# Patient Record
Sex: MALE | Race: WHITE
[De-identification: names, ages, dates, MRNs, and addresses within clinical notes are randomized per-mention and may not be internally consistent; named-entity substitution may affect disease eponyms.]

---

## 2018-07-09 ENCOUNTER — HOSPITAL ENCOUNTER (EMERGENCY)
Dept: HOSPITAL 97 - ER | Age: 73
Discharge: HOME | End: 2018-07-09
Payer: COMMERCIAL

## 2018-07-09 DIAGNOSIS — I10: ICD-10-CM

## 2018-07-09 DIAGNOSIS — J18.8: Primary | ICD-10-CM

## 2018-07-09 LAB
BUN BLD-MCNC: 15 MG/DL (ref 7–18)
GLUCOSE SERPLBLD-MCNC: 91 MG/DL (ref 74–106)
HCT VFR BLD CALC: 45.3 % (ref 39.6–49)
INR BLD: 1.26
LYMPHOCYTES # SPEC AUTO: 1.4 K/UL (ref 0.7–4.9)
MCH RBC QN AUTO: 32.9 PG (ref 27–35)
MCV RBC: 96.7 FL (ref 80–100)
PMV BLD: 9.2 FL (ref 7.6–11.3)
POTASSIUM SERPL-SCNC: 4.2 MMOL/L (ref 3.5–5.1)
RBC # BLD: 4.68 M/UL (ref 4.33–5.43)

## 2018-07-09 PROCEDURE — 85379 FIBRIN DEGRADATION QUANT: CPT

## 2018-07-09 PROCEDURE — 99285 EMERGENCY DEPT VISIT HI MDM: CPT

## 2018-07-09 PROCEDURE — 85610 PROTHROMBIN TIME: CPT

## 2018-07-09 PROCEDURE — 80048 BASIC METABOLIC PNL TOTAL CA: CPT

## 2018-07-09 PROCEDURE — 71275 CT ANGIOGRAPHY CHEST: CPT

## 2018-07-09 PROCEDURE — 36415 COLL VENOUS BLD VENIPUNCTURE: CPT

## 2018-07-09 PROCEDURE — 93005 ELECTROCARDIOGRAM TRACING: CPT

## 2018-07-09 PROCEDURE — 71046 X-RAY EXAM CHEST 2 VIEWS: CPT

## 2018-07-09 PROCEDURE — 81003 URINALYSIS AUTO W/O SCOPE: CPT

## 2018-07-09 PROCEDURE — 85025 COMPLETE CBC W/AUTO DIFF WBC: CPT

## 2018-07-09 PROCEDURE — 84484 ASSAY OF TROPONIN QUANT: CPT

## 2018-07-09 NOTE — XMS REPORT
Summary of Care: 18 - 18

 Created on:2022



Patient:LEISA SEPULVEDA

Sex:Male

:1945

External Reference #:[id number]





Demographics







 [place] [address]

 

 Home Phone  [phone]

 

 Email Address  [email or web address]

 

 Preferred Language  English

 

 Marital Status  Unknown

 

 Jew Affiliation  Unknown

 

 Race  White

 

 Additional Race(s)  Unavailable

 

 Ethnic Group  Not  or 









Author







 Organization  [facility]

 

 Address  [address]



   [place] [address]-

 

 Phone  [phone]









Encounter

HQ Hannantr_jaylyn(FIN) [id number] Date(s): 18 - 18

[facility] [address] [place], [place] [address] 

Attending Physician: Kimberley Sandoval MD





Vital Signs

No data available for this section



Problem List







 Condition  Effective Dates  Status  Health Status  Informant

 

 Arthritis(Confirmed)    Resolved    

 

 Arthritis of knee1    Active    

 

 Asthma2    Active    

 

 Atrial fibrillation3    Active    

 

 Back strain(Confirmed)    Resolved    

 

 Benign essential hypertension4    Active    

 

 Benign prostatic hypertrophy with    Active    



 outflow obstruction5        

 

 Broken forearm(Confirmed)6    Resolved    

 

 Bronchitis(Confirmed)    Resolved    

 

 Chronic obstructive lung disease7    Active    

 

 External hemorrhoid(Confirmed)    Resolved    

 

 Fatigue(Confirmed)    Resolved    

 

 Osteopenia8    Active    

 

 Peripheral autonomic neuropathy of    Resolved    



 unknown cause(Confirmed)        

 

 Peripheral nerve disease9    Active    

 

 Viral pneumonia(Confirmed)    Resolved    



1Data migrated from [employer] on 5/30/15.2Data migrated from [employer] 
on 5/30/15.3Data migrated from [employer] on 5/30/15.4Data migrated from [employer] on 5/30/15.5Data migrated from [employer] on 5/30/[id number] 
migrated from [employer] on 5/30/15.8Data migrated from [employer] on 5/30
/15.9Data migrated from [employer] on 5/30/15.



Allergies, Adverse Reactions, Alerts







 Substance  Reaction  Severity  Status

 

 NKDA1      Active



1Data migrated from [employer] on 16. Originally documented as NKA.



Medications

No data available for this section



Results

No data available for this section



Immunizations

No data available for this section



Procedures







 Procedure  Date  Related Diagnosis  Body Site  Status

 

 Procedure on eye        Completed

 

 Procedure on wrist        Completed

 

 Sebaceous cyst removal1        Completed



1Cyst removed from back



Social History







 Social History Type  Response

 

 Substance Abuse  Use: None.

 

 Employment/School  Status: Retired.1

 

 Alcohol  Current, Type Wine.  Frequency: 1-2 times per week.

 

 Smoking Status  Former smoker; Exposure to Tobacco Smoke None; Cigarette 
Smoking Last 365 Days No; Reg Smoking Cessation Counseling No



   entered on: 3/15/18



1Surrogate decision maker- Wife- Carole Lacho [phone]



Assessment and Plan

No data available for this section

## 2018-07-09 NOTE — XMS REPORT
Continuity of Care Document

 Created on:2013



Patient:LEISA SEPULVEDA

Sex:Male

:1945

External Reference #:[id number]





Demographics







 Address  [address] [place] [address]

 

 Phone  [phone]

 

 Preferred Language  Unknown

 

 Marital Status  Other

 

 Mosque Affiliation  Unknown

 

 Race  Unknown

 

 Ethnic Group  Unknown









Author







 Organization  [facility]









Care Team Providers







 Name  Role  Phone

 

 MD Crys, Enmanuel  Unavailable  Unavailable









Insurance Providers







 Payer name  Policy type /  Policy ID  Covered party ID  Policy Tran



   Coverage type      

 

 [place]: [place]        



 SOLUTIONS        

 

 [employer] - [employer] -        



 CHOICE (POS II)        







Encounters







 Encounter  Performer  Location  Date

 

 Lab Report  Enmanuel Spangler MD  [facility]  2013







Problems







 Problem  Effective Dates  Problem Status

 

 ATRIAL FIBRILLATION    Active

 

 ESSENTIAL HYPERTENSION, BENIGN    Active

 

 OSTEOPENIA    Active

 

 COPD    Active

 

 ASTHMA, UNSPECIFIED, UNSPECIFIED STATUS    Active

 

 BENIGN PROSTATIC HYPERTROPHY, WITH URINARY OBSTRUCTION    Active

 

 ARTHRITIS, KNEES, BILATERAL    Active

 

 PERIPHERAL NEUROPATHY    Active

 

 PREVENTIVE HEALTH CARE  2013  Active







Procedures







 Date  Description  Comments

 

 Sep 10, 2010  bone density  Done

 

 Sep 10, 2010  bone density  Done

 

 2013  smoking status  current every day smoker

 

 Sep 20, 2010  bone density  Complete std dev







Medications







 Medication  Instructions  Start Date  Status

 

 NAPROXEN SODIUM 550 MG TABS  PRN    Inactive

 

 LOTREL 5-20 MG CAPS  1 tablet twice day  2013  Active

 

 NAPROXEN 500 MG TABS  1 tablet daily  2013  Active

 

 MULTAQ 400 MG TABS  1 tablet twice day  2013  Active

 

 PRADAXA 150 MG CAPS  1 tablet daily  2013  Active

 

 CENTRUM SILVER ULTRA MENS TABS    2013  Active

 

 SUPER B-COMPLEX CAPS    2013  Active

 

 DEXILANT 30 MG CPDR    2013  Active







Vital Signs







 Date  Description  Test  Result

 

 2013  weight E&M - 3141-9  WEIGHT  200 lb

 

 2013  temperature E&M  TEMPERATURE  97.0 deg f

 

 2013  pulse rate E&M - 8867-4  PULSE RATE  68 /min

 

 2013  blood pressure, systolic - 8480-6  BP SYSTOLIC  150 mm Hg

 

 2013  blood pressure, diastolic - 8462-4  BP DIASTOLIC  80 mm Hg

 

 2013  height E&M - 8302-2  HEIGHT  73 in







Results







 Date  Description  Test Name  Value  Reference  Interpretation  Status

 

 ,  hemoglobin, blood  HGB  15.4 g/dL  14.0-18.0    



 2013,  hematocrit, blood  HCT  45.7 %  42.0-54.0    



 2013,  platelet count  PLATELETS  177 K/CMM  133-450    



       /mm3      

 

 ,  hemoglobin, blood  HGB  15.4 g/dL  14.0-18.0    



 2013,  hematocrit, blood  HCT  45.7 %  42.0-54.0    



 2013,  platelet count  PLATELETS  177 K/CMM  133-450    



       /mm3      

 

 ,  cholesterol, serum  CHOLESTEROL  190 mg/dl  <=199    



 2013,  triglyceride, serum,  TRIGLYCERIDE  45 mg/dl  <=149    



   fasting          

 

 ,  HDL cholesterol,  HDL  69 mg/dl  >=61    



 2013  serum          

 

 Nov 05,  sodium, serum  SODIUM  142 MEQ/L  135-145    



       mmol/L      

 

 ,  potassium, serum  POTASSIUM  4.2 MEQ/L  3.5-5.1    



       mmol/L      

 

 ,  creatinine, serum  CREATININE  1.0 mg/dL  0.5-1.4    



 2013,  urea nitrogen, blood  BUN  18 mg/dL  7-22    



 2013,  urea  BUN/CREAT  18 null  6-25    



   nitrogen/creatinine          



   ratio, serum          

 

 ,  albumin, serum  ALBUMIN  4.3 g/dL  3.5-5.0    



 2013,  calcium, serum  CALCIUM  9.3 mg/dL  8.5-10.5    



 2013,  alanine  SGPT (ALT)  27 U/L  0-65    



   aminotransferase          



   (SGPT), serum          

 

 ,  aspartate  SGOT (AST)  19 U/L  0-37    



   aminotransferase          



   (SGOT), serum          

 

 ,  alkaline  ALK PHOS  80 U/L      



   phosphatase, serum          

 

 ,  thyroid stimulating  TSH  1.570  0.360-3.740    



   hormone, serum    uIU/mL      

 

 ,  prostate specific  PSA  2.54  0.00-4.00    



   antigen    ng/mL      

 

 Sep 25,  prostate specific  PSA  3.3 ng/mL      



   antigen          

 

 Sep 25,  prostate specific  PSA  3.3 ng/mL      



   antigen          

 

 ,  cholesterol, serum  CHOLESTEROL  190 mg/dl  <=199    



 2013,  triglyceride, serum,  TRIGLYCERIDE  45 mg/dl  <=149    



   fasting          

 

 ,  HDL cholesterol,  HDL  69 mg/dl  >=61    



   serum          

 

 ,  LDL cholesterol,  LDL  112 mg/dl  <=99  High  



 2013  serum          

 

 Nov 05,  sodium, serum  SODIUM  142 MEQ/L  135-145    



       mmol/L      

 

 ,  potassium, serum  POTASSIUM  4.2 MEQ/L  3.5-5.1    



       mmol/L      

 

 ,  creatinine, serum  CREATININE  1.0 mg/dL  0.5-1.4    



 2013,  urea nitrogen, blood  BUN  18 mg/dL  7-22    



 2013,  urea  BUN/CREAT  18 null  6-25    



   nitrogen/creatinine          



   ratio, serum          

 

 ,  albumin, serum  ALBUMIN  4.3 g/dL  3.5-5.0    



 2013,  calcium, serum  CALCIUM  9.3 mg/dL  8.5-10.5    



 2013,  alanine  SGPT (ALT)  27 U/L  0-65    



   aminotransferase          



   (SGPT), serum          

 

 ,  aspartate  SGOT (AST)  19 U/L  0-37    



   aminotransferase          



   (SGOT), serum          

 

 ,  alkaline  ALK PHOS  80 U/L      



   phosphatase, serum          

 

 ,  thyroid stimulating  TSH  1.570  0.360-3.740    



   hormone, serum    uIU/mL      

 

 ,  prostate specific  PSA  2.54  0.00-4.00    



   antigen    ng/mL

## 2018-07-09 NOTE — XMS REPORT
Continuity of Care Document

 Created on:2018



Patient:LEISA SEPULVEDA

Sex:Male

:1945

External Reference #:[id number]





Demographics







 Address  [address] [place] [address]

 

 Phone  [phone]

 

 Phone  [phone]

 

 Preferred Language  Unknown

 

 Marital Status  Unknown

 

 [employer] Affiliation  Unknown

 

 Race  Unknown

 

 Ethnic Group  Unknown









Author







 Organization  [facility]









Problems







 Problem  Status  Onset  Classification  Date  Comments  Source



     Date    Reported    



             



             



             

 

 PREVENTIVE  Active  20  Condition  2015     Medical



 HEALTH CARE    13        Group



             



             

 

 ATRIAL  Active    Condition  2015     Medical



 FIBRILLATION            Group



             



             

 

 ESSENTIAL  Active    Condition  2015     Medical



 HYPERTENSION,            Group



 BENIGN            



             

 

 OSTEOPENIA  Active    Condition  2015     Medical



             Group



             



             

 

 COPD  Active    Condition  2015     Medical



             Group



             



             

 

 ASTHMA,  Active    Condition  2015     Medical



 UNSPECIFIED,            Group



 UNSPECIFIED            



 STATUS            



             

 

 BENIGN  Active    Condition  2015     Medical



 PROSTATIC            Group



 HYPERTROPHY,            



 WITH URINARY            



 OBSTRUCTION            



             



             

 

 ARTHRITIS,  Active    Condition  2015     Medical



 KNEES,            Group



 BILATERAL            



             



             

 

 PERIPHERAL  Active    Condition  2015     Medical



 NEUROPATHY            Group



             



             

 

 Arthritis  Resolved    Problem  2018     Medical



             Group



             



             

 

 Arthritis of  Active    Problem  2018  Data migrated   Medical



 knee<sup>1</sup          from [employer]  Group



 >          [employer] on  



           5/30/15.  



             



             

 

 Asthma<sup>2</s  Active    Problem  2018  Data migrated   Medical



 up>          from [employer]  Group



           [employer] on  



           5/30/15.  



             



             

 

 Atrial  Active    Problem  2018  Data migrated   Medical



 fibrillation<singer          from GE  Group



 p>3</sup>          [employer] on  



           5/30/15.  



             



             

 

 Back strain  Resolved    Problem  2018     Medical



             Group



             



             

 

 Benign  Active    Problem  2018  Data migrated   Medical



 essential          from [employer]  Group



 hypertension<singer          [employer] on  



 p>4</sup>          5/30/15.  



             



             

 

 Benign  Active    Problem  2018  Data migrated   Medical



 prostatic          from [employer]  Group



 hypertrophy          [employer] on  



 with outflow          5/30/15.  



 obstruction<sup            



 >5</sup>            



             

 

 Broken  Resolved    Problem  2018  Left   Medical



 forearm<sup>6</            Group



 sup>            



             

 

 Bronchitis  Resolved    Problem  2018     Medical



             Group



             



             

 

 Chronic  Active    Problem  2018  Data migrated   Medical



 obstructive          from [employer]  Group



 lung          [employer] on  



 disease<sup>7</          5/30/15.  



 sup>            



             

 

 External  Resolved    Problem  2018     Medical



 hemorrhoid            Group



             



             

 

 Fatigue  Resolved    Problem  2018     Medical



             Group



             



             

 

 Osteopenia<sup>  Active    Problem  2018  Data migrated   Medical



 8</sup>          from [employer]  Group



           [employer] on  



           5/30/15.  



             



             

 

 Peripheral  Resolved    Problem  2018     Medical



 autonomic            Group



 neuropathy of            



 unknown cause            



             



             

 

 Peripheral  Active    Problem  2018  Data migrated   Medical



 nerve          from [employer]  Group



 disease<sup>9</          [employer] on  



 sup>          5/30/15.  



             



             

 

 Viral pneumonia  Resolved    Problem  2018     Medical



             Group



             



             







Medications







 Medication  Details  Route  Status  Patient  Ordering  Order  Source



         Instructions  Provider  Date  



               



               



               

 

 naproxen 500 mg  500 mg=1 tab,    Active      03/15/2  [facility]



 oral tablet  PO, PRN, PRN          018  Medical



   Pain Score            Group



   6-10, # 30            



   caplet, 6            



   Refill(s),            



   Pharmacy: [facility]            



   Pharmacy            



               



               

 

 Amlodipine 10 MG  See    Active      03/15/2  [facility]



 / Benazepril  Instructions,          018  Medical



 hydrochloride 40  TAKE 1 CAPSULE            Group



 MG Oral Capsule  DAILY, # 90            



   tab, 3            



   Refill(s),            



   Pharmacy: [facility]            



   Pharmacy            



               



               

 

 naproxen 500 mg  500 mg=1 tab,    No      01/15/2  [facility]



 oral tablet  PO, PRN, PRN    Longer      018  Medical



   Pain Score    Active        Group



   6-10, # 30            



   caplet, 6            



   Refill(s),            



   Pharmacy:            



   [facility]            



   Delivery            



               



               

 

 NAPROXEN SODIUM  PRN    No        MH



 550 MG TABS      Longer        Medical



       Active        Group



               



               



               

 

 LOTREL 5-20 MG  1 tablet twice    Active        [facility]



 CAPS  day          013  Medical



               Group



               



               

 

 NAPROXEN 500 MG  1 tablet daily    Active        [facility]



 TABS              Medical



               Group



               



               

 

 MULTAQ 400 MG  1 tablet twice    Active        MH



 TABS  day            Medical



               Group



               



               

 

 PRADAXA 150 MG  1 tablet daily    Active        [facility]



 CAPS            013  Medical



               Group



               



               

 

 CENTRUM SILVER      Active        MH



 ULTRA MENS TABS              Medical



               Group



               



               

 

 SUPER B-COMPLEX      Active        MH



 CAPS              Medical



               Group



               



               

 

 DEXILANT 30 MG      Active        MH



 CPDR              Medical



               Group



               



               

 

 NAPROXEN SODIUM  PRN    No        MH



 550 MG TABS      Longer        Medical



       Active        Group



               



               



               

 

 NAPROXEN 500 MG  1 tablet daily    Active        MH



 TABS              Medical



               Group



               



               

 

 MULTAQ 400 MG  1 tablet twice    Active        MH



 TABS  day          013  Medical



               Group



               



               

 

 PRADAXA 150 MG  1 tablet daily    Active        



 CAPS            013  Medical



               Group



               



               







Allergies, Adverse Reactions, Alerts







 Substance  Category  Reaction  Severity  Reaction  Status  Date  Comments  
Source



         type    Reported    



                 



                 



                 

 

 NKDA<sup>1<  Assertion      Drug  Active    Data  MH



 /sup>        allergy    5  migrated  Medical



               from [facility]  



               on  



               16.  



               Originally  



               documented  



               as NKA.  



                 



                 







Immunizations







 Immunization  Date Given  Site  Status  Last Updated  Comments  Source



             



             







Results







 Order Name  Results  Value  Reference  Date  Interpretation  Comments  Source



       Range        



               



               

 

 Chemistry  CHOLESTEROL  159   - 199        



     mg/dl          Medical



               Group



               



               

 

 Chemistry  TRIGLYCERIDE  48   - 149        



     mg/dl          Medical



               Group



               



               

 

 Chemistry  HDL  63  >=61        



     mg/dl          Medical



               Group



               



               

 

 Chemistry  LDL  86   - 99        



     mg/dl          Medical



               Group



               



               

 

 Chemistry  SODIUM  140  135 - 145  



     MEQ/L          Medical



     mmol/L          Group



               



               



               

 

 Chemistry  POTASSIUM  4.6  3.5 - 5.1  



     MEQ/L          Medical



     mmol/L          Group



               



               



               

 

 Chemistry  CREATININE  1.2  0.5 - 1.4  



     mg/dL          Medical



               Group



               



               

 

 Chemistry  BUN  22  7 - 22        



     mg/dL          Medical



               Group



               



               

 

 Chemistry  BUN/CREAT  18  6 - 25  2015      Medical



               Group



               



               

 

 Chemistry  ALBUMIN  4.2  3.5 - 5.0  



     g/dL          Medical



               Group



               



               

 

 Chemistry  CALCIUM  9.0  8.5 - 10.5  



     mg/dL          Medical



               Group



               



               

 

 Chemistry  SGPT (ALT)  25 U/L  0 - 65  2015      Medical



               Group



               



               

 

 Chemistry  SGOT (AST)  23 U/L  0 - 37  2015      Medical



               Group



               



               

 

 Chemistry  ALK PHOS  78 U/L  39 - 136  2015      Medical



               Group



               



               

 

 Chemistry  TSH  1.960  0.360 -  



     uIU/mL  3.740        Medical



               Group



               



               

 

 Chemistry  PSA  2.46  0.00 - 4.00  



     ng/mL          Medical



               Group



               



               

 

 Hematology  HGB  16.0  14.0 - 18.0  



     g/dL          Medical



               Group



               



               

 

 Hematology  HCT  46.0 %  42.0 - 54.0  2015      Medical



               Group



               



               

 

 Hematology  PLATELETS  193  133 - 450  



     K/CMM          Medical



     /mm3          Group



               



               



               

 

 Chemistry  CHOLESTEROL  190   - 199  



     mg/dl          Medical



               Group



               



               

 

 Chemistry  TRIGLYCERIDE  45   - 149  11/      



     mg/dl          Medical



               Group



               



               

 

 Chemistry  HDL  69  >=61  11



     mg/dl          Medical



               Group



               



               

 

 Chemistry  CHOLESTEROL  190   - 199  11



     mg/dl          Medical



               Group



               



               

 

 Chemistry  TRIGLYCERIDE  45   - 149  11



     mg/dl          Medical



               Group



               



               

 

 Chemistry  HDL  69  >=61  11/      



     mg/dl          Medical



               Group



               



               

 

 Chemistry  SODIUM  142  135 - 145  



     MEQ/L          Medical



     mmol/L          Group



               



               



               

 

 Chemistry  POTASSIUM  4.2  3.5 - 5.1  



     MEQ/L          Medical



     mmol/L          Group



               



               



               

 

 Chemistry  CREATININE  1.0  0.5 - 1.4  



     mg/dL          Medical



               Group



               



               

 

 Chemistry  BUN  18  7 - 22  



     mg/dL          Medical



               Group



               



               

 

 Chemistry  BUN/CREAT  18  6 - 25  2013      Medical



               Group



               



               

 

 Chemistry  ALBUMIN  4.3  3.5 - 5.0  



     g/dL          Medical



               Group



               



               

 

 Chemistry  CALCIUM  9.3  8.5 - 10.5  



     mg/dL          Medical



               Group



               



               

 

 Chemistry  SGPT (ALT)  27 U/L  0 - 65  2013      Medical



               Group



               



               

 

 Chemistry  SGOT (AST)  19 U/L  0 - 37  2013      Medical



               Group



               



               

 

 Chemistry  PSA  2.54  0.00 - 4.00  



     ng/mL          Medical



               Group



               



               

 

 Chemistry  TRIGLYCERIDE  45   - 149  



     mg/dl          Medical



               Group



               



               

 

 Chemistry  HDL  69  >=61  



     mg/dl          Medical



               Group



               



               

 

 Chemistry  CHOLESTEROL  190   - 199  11



     mg/dl          Medical



               Group



               



               

 

 Chemistry  TRIGLYCERIDE  45   - 149  11



     mg/dl          Medical



               Group



               



               

 

 Chemistry  HDL  69  >=61  11/      



     mg/dl          Medical



               Group



               



               

 

 Chemistry  LDL  112   - 99  



     mg/dl          Medical



               Group



               



               

 

 Chemistry  SODIUM  142  135 - 145  



     MEQ/L          Medical



     mmol/L          Group



               



               



               

 

 Chemistry  POTASSIUM  4.2  3.5 - 5.1  



     MEQ/L          Medical



     mmol/L          Group



               



               



               

 

 Chemistry  CREATININE  1.0  0.5 - 1.4  11



     mg/dL          Medical



               Group



               



               

 

 Chemistry  BUN  18  7 - 22  



     mg/dL          Medical



               Group



               



               

 

 Chemistry  BUN/CREAT  18  6 - 25  2013      Medical



               Group



               



               

 

 Chemistry  ALBUMIN  4.3  3.5 - 5.0  



     g/dL          Medical



               Group



               



               

 

 Chemistry  CALCIUM  9.3  8.5 - 10.5  



     mg/dL          Medical



               Group



               



               

 

 Chemistry  SGPT (ALT)  27 U/L  0 - 65  2013      Medical



               Group



               



               

 

 Chemistry  SGOT (AST)  19 U/L  0 - 37  2013      Medical



               Group



               



               

 

 Chemistry  ALK PHOS  80 U/L  39 - 136  2013      Medical



               Group



               



               

 

 Chemistry  TSH  1.570  0.360 -  



     uIU/mL  3.740        Medical



               Group



               



               

 

 Chemistry  ALK PHOS  80 U/L  39 - 136  2013      Medical



               Group



               



               

 

 Chemistry  TSH  1.570  0.360 -  



     uIU/mL  3.740        Medical



               Group



               



               

 

 Chemistry  PSA  2.54  0.00 - 4.00  



     ng/mL          Medical



               Group



               



               

 

 Hematology  HGB  15.4  14.0 - 18.0  



     g/dL          Medical



               Group



               



               

 

 Hematology  HCT  45.7 %  42.0 - 54.0  2013      Medical



               Group



               



               

 

 Hematology  PLATELETS  177  133 - 450  



     K/CMM          Medical



     /mm3          Group



               



               



               

 

 Hematology  HGB  15.4  14.0 - 18.0  



     g/dL          Medical



               Group



               



               

 

 Hematology  HCT  45.7 %  42.0 - 54.0  2013      Medical



               Group



               



               

 

 Hematology  PLATELETS  177  133 - 450  



     K/CMM          Medical



     /mm3          Group



               



               



               

 

 Chemistry  PSA  3.3    /      MH



     ng/mL          Medical



               Group



               



               

 

 Chemistry  PSA  3.3    /      MH



     ng/mL          Medical



               Group



               



               

 

 Chemistry  PSA  3.3    /      MH



     ng/mL          Medical



               Group



               



               

 

 Chemistry  PSA  3.3    /      MH



     ng/mL          Medical



               Group



               



               







Vital Signs







 Vital Sign  Value  Date  Comments  Source



         

 

 Temperature Oral (F)  98.5 F  03/15/2018     Medical Group



         



         

 

 Heart Rate  85  03/15/2018     Medical Group



         



         

 

 BMI Calculated  26.08  03/15/2018     Medical Group



         



         

 

 Height  190.5 cm  03/15/2018     Medical Group



         



         

 

 Weight  94.636  03/15/2018     Medical Group



         



         

 

 Systolic (mm Hg)  141  03/15/2018     Medical Group



         



         

 

 Diastolic (mm Hg)  80  03/15/2018     Medical Group



         



         

 

 Weight  204  2015     Medical Group



         



         

 

 Temperature Oral (F)  98.1 F  2015     Medical Group



         



         

 

 Heart Rate  74  2015     Medical Group



         



         

 

 Systolic (mm Hg)  146  2015     Medical Group



         



         

 

 Diastolic (mm Hg)  78  2015     Medical Group



         



         

 

 Height  73  2015     Medical Group



         



         

 

 Weight  200  2013     Medical Group



         



         

 

 Temperature Oral (F)  97.0 F  2013     Medical Group



         



         

 

 Heart Rate  68  2013     Medical Group



         



         

 

 Systolic (mm Hg)  150  2013     Medical Group



         



         

 

 Diastolic (mm Hg)  80  2013     Medical Group



         



         

 

 Height  73  2013     Medical Group



         



         







Encounters







 Location  Location  Encounter  Encounter  Reason  Attending  ADM  DC  Status  
Source



   Details  Type  Number  For  Provider  Date  Date    



         Visit          



                   



                   



                   

 

 [facility]    Lab Report  [id number]    [facility]      



 [place]      [address]    [facility],      Medical



 Medical          MD        Group



 Group                  



 [facility]    Office  [id number]    Param      [facility]    Visit  [address]    [facility],      Medical



 Medical          MD        Group



 Group                  



 [facility]    Lab Report  [id number]    [facility]      [facility]      [address]    [place],      Medical



 Medical          MD        Group



 Group                  



 [facility]    Phone  [phone]      01/15  01/17    



 Primary    Message            Medical



 Care                  Group



 [facility]                  



                   



                   

 

     Outpatient  [id number]    PARAM  03/15    [facility]      [facility]    Outpatient  [id number]    Param  03/15  03/16    



 Primary          [facility]      Medical



 Care                  Group



 [facility]                  



                   



                   

 

     Outpatient  [id number]    [place]      Active  [facility]        [facility]    Ambulatory  [id number]    [place]      



 Primary    Pre-Reg      [facility]      Medical



 Care                  Group



 [facility]                  



                   



                   







Procedures







 Procedure  Code  Date  Perfomer  Comments  Source



           



           

 

 Collection of  14036  03/15/2018       Medical



 venous blood by          Group



 venipuncture          



           

 

 bone density  4002.65  2010    Complete std   Medical



         dev  Group



           



           

 

 bone density  4002.65  09/10/2010    Done   Medical



           Group



           



           

 

 bone density  4002.65  09/10/2010    Done   Medical



           Group



           



           

 

 Procedure on eye  [id number]         Medical



           Group



           

 

 Procedure on wrist  [id number]         Medical



           Group



           

 

 Sebaceous cyst  [id number]      Cyst removed   Medical



 removal<sup>1</sup>        from back  Group

## 2018-07-09 NOTE — XMS REPORT
Summary of Care: 1/15/18 - 18

 Created on:2028



Patient:LEISA SEPULVEDA

Sex:Male

:1945

External Reference #:[id number]





Demographics







 [place] [address]

 

 Home Phone  [phone]

 

 Email Address  [email or web address]

 

 Preferred Language  English

 

 Marital Status  Unknown

 

 Sabianist Affiliation  Unknown

 

 Race  White

 

 Additional Race(s)  Unavailable

 

 Ethnic Group  Not  or 









Author







 Organization  [facility]

 

 Address  [address]



   [place] [address]-

 

 Phone  [phone]









Encounter

HQ Hannantr_jaylyn(FIN) [id number] Date(s): 1/15/18 - 18

[facility] [address] [place], [place] [address] 





Vital Signs

No data available for this section



Problem List







 Condition  Effective Dates  Status  Health Status  Informant

 

 Arthritis(Confirmed)    Resolved    

 

 Arthritis of knee1    Active    

 

 Asthma2    Active    

 

 Atrial fibrillation3    Active    

 

 Back strain(Confirmed)    Resolved    

 

 Benign essential hypertension4    Active    

 

 Benign prostatic hypertrophy with    Active    



 outflow obstruction5        

 

 Broken forearm(Confirmed)6    Resolved    

 

 Bronchitis(Confirmed)    Resolved    

 

 Chronic obstructive lung disease7    Active    

 

 External hemorrhoid(Confirmed)    Resolved    

 

 Fatigue(Confirmed)    Resolved    

 

 Osteopenia8    Active    

 

 Peripheral autonomic neuropathy of    Resolved    



 unknown cause(Confirmed)        

 

 Peripheral nerve disease9    Active    

 

 Viral pneumonia(Confirmed)    Resolved    



1Data migrated from [employer] on 5/30/15.2Data migrated from [employer] 
on 5/30/15.3Data migrated from [employer] on 5/30/15.4Data migrated from [employer] 
[employer] on 5/30/15.5Data migrated from [employer] on 5/30/[id number] 
migrated from [employer] on 5/30/15.8Data migrated from [employer] on 5/30
/15.9Data migrated from [employer] on 5/30/15.



Allergies, Adverse Reactions, Alerts







 Substance  Reaction  Severity  Status

 

 NKDA1      Active



1Data migrated from [employer] on 16. Originally documented as NKA.



Medications

naproxen 500 mg oral tablet

500 mg=1 tab, PO, PRN, PRN Pain Score 6-10, # 30 caplet, 6 Refill(s), Pharmacy: 
[facility]

Start Date: 1/15/18

Stop Date: 3/15/18

Status: Discontinued



Results

No data available for this section



Immunizations

No data available for this section



Procedures







 Procedure  Date  Related Diagnosis  Body Site  Status

 

 Procedure on eye        Completed

 

 Procedure on wrist        Completed

 

 Sebaceous cyst removal1        Completed



1Cyst removed from back



Social History







 Social History Type  Response

 

 Substance Abuse  Use: None.

 

 Employment/School  Status: Retired.1

 

 Alcohol  Current, Type Wine.  Frequency: 1-2 times per week.

 

 Smoking Status  Former smoker; Exposure to Tobacco Smoke None; Cigarette 
Smoking Last 365 Days No; Reg Smoking Cessation Counseling No



   entered on: 3/15/18



1Surrogate decision maker- Wife- Carole Lacho [phone]



Assessment and Plan

No data available for this section

## 2018-07-09 NOTE — XMS REPORT
Summary of Care: 18 - 18

 Created on:2112



Patient:LEISA SEPULVEDA

Sex:Male

:1945

External Reference #:[id number]





Demographics







 [place] [address]

 

 Home Phone  [phone]

 

 Email Address  [email or web address]

 

 Preferred Language  English

 

 Marital Status  Unknown

 

 Temple Affiliation  Unknown

 

 Race  White

 

 Additional Race(s)  Unavailable

 

 Ethnic Group  Not  or 









Author







 Organization  [facility]

 

 Address  [address]



   [place] [address]-

 

 Phone  [phone]









Encounter

HQ Hannantr_jaylyn(FIN) [id number] Date(s): 18 - 18

[facility] [address] [place], [place] [address] 

Attending Physician: Kimberley Sandoval MD





Vital Signs

No data available for this section



Problem List







 Condition  Effective Dates  Status  Health Status  Informant

 

 Arthritis(Confirmed)    Resolved    

 

 Arthritis of knee1    Active    

 

 Asthma2    Active    

 

 Atrial fibrillation3    Active    

 

 Back strain(Confirmed)    Resolved    

 

 Benign essential hypertension4    Active    

 

 Benign prostatic hypertrophy with    Active    



 outflow obstruction5        

 

 Broken forearm(Confirmed)6    Resolved    

 

 Bronchitis(Confirmed)    Resolved    

 

 Chronic obstructive lung disease7    Active    

 

 External hemorrhoid(Confirmed)    Resolved    

 

 Fatigue(Confirmed)    Resolved    

 

 Osteopenia8    Active    

 

 Peripheral autonomic neuropathy of    Resolved    



 unknown cause(Confirmed)        

 

 Peripheral nerve disease9    Active    

 

 Viral pneumonia(Confirmed)    Resolved    



1Data migrated from [employer] on 5/30/15.2Data migrated from [employer] 
on 5/30/15.3Data migrated from [employer] on 5/30/15.4Data migrated from [employer] on 5/30/15.5Data migrated from [employer] on 5/30/[id number] 
migrated from [employer] on 5/30/15.8Data migrated from [employer] on 5/30
/15.9Data migrated from [employer] on 5/30/15.



Allergies, Adverse Reactions, Alerts







 Substance  Reaction  Severity  Status

 

 NKDA1      Active



1Data migrated from [employer] on 16. Originally documented as NKA.



Medications

No data available for this section



Results

No data available for this section



Immunizations

No data available for this section



Procedures







 Procedure  Date  Related Diagnosis  Body Site  Status

 

 Procedure on eye        Completed

 

 Procedure on wrist        Completed

 

 Sebaceous cyst removal1        Completed



1Cyst removed from back



Social History







 Social History Type  Response

 

 Substance Abuse  Use: None.

 

 Employment/School  Status: Retired.1

 

 Alcohol  Current, Type Wine.  Frequency: 1-2 times per week.

 

 Smoking Status  Former smoker; Exposure to Tobacco Smoke None; Cigarette 
Smoking Last 365 Days No; Reg Smoking Cessation Counseling No



   entered on: 3/15/18



1Surrogate decision maker- Wife- Carole Lacho [phone]



Assessment and Plan

No data available for this section

## 2018-07-09 NOTE — RAD REPORT
EXAM DESCRIPTION:  RAD - Chest Pa And Lat (2 Views) - 7/9/2018 5:55 pm

 

CLINICAL HISTORY:  COUGH

Chest pain.

 

COMPARISON:  CHEST PA AND LAT 2 VIEW dated 12/13/2013; CHEST PA AND LAT 2 VIEW dated 11/18/2011; CHES
T PA AND LAT 2 VIEW dated 4/13/2001; Abdomen   Pelvis W Contrast dated 6/21/2018

 

FINDINGS:  The lungs are emphysematous with blunting of the right costophrenic angle which may indica
te a small amount of pleural fluid or pleural thickening. No focal infiltrate typical of pneumonia id
entified. The heart is mildly enlarged in size. No displaced fractures.

 

IMPRESSION:  Prominent COPD.

 

Mild pleural thickening or pleural effusion on the right suspected.

## 2018-07-09 NOTE — EKG
Test Date:    2018-07-09               Test Time:    17:19:11

Technician:   BOWEN                                     

                                                     

MEASUREMENT RESULTS:                                       

Intervals:                                           

Rate:         87                                     

VA:                                                  

QRSD:         90                                     

QT:           398                                    

QTc:          478                                    

Axis:                                                

P:                                                   

VA:                                                  

QRS:          -25                                    

T:            4                                      

                                                     

INTERPRETIVE STATEMENTS:                                       

                                                     

Atrial fibrillation with premature ventricular or aberrantly conducted

complexes

Abnormal ECG

Compared to ECG 05/08/2013 10:16:20

Ventricular premature complex(es) now present



Electronically Signed On 07-09-18 22:17:09 CDT by Tam Craft

## 2018-07-09 NOTE — XMS REPORT
Continuity of Care Document

 Created on:2015



Patient:LEISA SEPULVEDA

Sex:Male

:1945

External Reference #:[id number]





Demographics







 Address  [address] [place] [address]

 

 Phone  [phone]

 

 Preferred Language  Unknown

 

 Marital Status  Other

 

 Restoration Affiliation  Unknown

 

 Race  Unknown

 

 Ethnic Group  Unknown









Author







 Organization  [facility]









Care Team Providers







 Name  Role  Phone

 

 MD Crys, Enmanuel  Unavailable  Unavailable









Insurance Providers







 Payer name  Policy type /  Policy ID  Covered party ID  Policy Tran



   Coverage type      

 

 [place]        



 SOLUTIONS        

 

 [employer] - [employer] -        



 CHOICE (POS II)        

 

 [employer] - [employer] -        



 CHOICE (POS II)        

 

 [place]        



 SOLUTIONS        

 

 [employer] - [employer]        



 (MEDICARE REPLACEMENT P        

 

 [employer] - [employer] -        



 CHOICE (POS II)        

 

 [place]        



 SOLUTIONS        

 

 [employer] - [employer] -        



 CHOICE (POS II)        

 

 [place]        



 SOLUTIONS        







Encounters







 Encounter  Performer  Location  Date

 

 Office Visit  Enmanuel Spangler MD  [facility]  
2015







Problems







 Problem  Effective Dates  Problem Status

 

 ATRIAL FIBRILLATION    Active

 

 ESSENTIAL HYPERTENSION, BENIGN    Active

 

 OSTEOPENIA    Active

 

 COPD    Active

 

 ASTHMA, UNSPECIFIED, UNSPECIFIED STATUS    Active

 

 BENIGN PROSTATIC HYPERTROPHY, WITH URINARY OBSTRUCTION    Active

 

 ARTHRITIS, KNEES, BILATERAL    Active

 

 PERIPHERAL NEUROPATHY    Active

 

 PREVENTIVE HEALTH CARE  2013  Active







Procedures







 Date  Description  Comments

 

 Sep 10, 2010  bone density  Done

 

 Sep 10, 2010  bone density  Done

 

 2013  smoking status  current every day smoker

 

 Sep 20, 2010  bone density  Complete std dev

 

 2015  smoking status  Former smoker







Medications







 Medication  Instructions  Start Date  Status

 

 NAPROXEN SODIUM 550 MG TABS  PRN    Inactive

 

 LOTREL 5-20 MG CAPS  1 tablet twice day  2013  Active

 

 NAPROXEN 500 MG TABS  1 tablet daily  2013  Active

 

 MULTAQ 400 MG TABS  1 tablet twice day  2013  Active

 

 PRADAXA 150 MG CAPS  1 tablet daily  2013  Active

 

 CENTRUM SILVER ULTRA MENS TABS    2013  Active

 

 SUPER B-COMPLEX CAPS    2013  Active

 

 DEXILANT 30 MG CPDR    2013  Active







Vital Signs







 Date  Description  Test  Result

 

 2013  weight E&M - 3141-9  WEIGHT  200 lb

 

 2013  temperature E&M  TEMPERATURE  97.0 deg f

 

 2013  pulse rate E&M - 8867-4  PULSE RATE  68 /min

 

 2013  blood pressure, systolic - 8480-6  BP SYSTOLIC  150 mm Hg

 

 2013  blood pressure, diastolic - 8462-4  BP DIASTOLIC  80 mm Hg

 

 2013  height E&M - 8302-2  HEIGHT  73 in

 

 2015  weight E&M - 3141-9  WEIGHT  204 lb

 

 2015  temperature E&M  TEMPERATURE  98.1 deg f

 

 2015  pulse rate E&M - 8867-4  PULSE RATE  74 /min

 

 2015  blood pressure, systolic - 8480-6  BP SYSTOLIC  146 mm Hg

 

 2015  blood pressure, diastolic - 8462-4  BP DIASTOLIC  78 mm Hg

 

 2015  height E&M - 8302-2  HEIGHT  73 in







Results







 Date  Description  Test Name  Value  Reference  Interpretation  Status

 

 ,  hemoglobin, blood  HGB  15.4 g/dL  14.0-18.0    



 2013,  hematocrit, blood  HCT  45.7 %  42.0-54.0    



 2013,  platelet count  PLATELETS  177 K/CMM  133-450    



       /mm3      

 

 ,  hemoglobin, blood  HGB  15.4 g/dL  14.0-18.0    



 2013,  hematocrit, blood  HCT  45.7 %  42.0-54.0    



 2013,  platelet count  PLATELETS  177 K/CMM  133-450    



       /mm3      

 

 ,  cholesterol, serum  CHOLESTEROL  190 mg/dl  <=199    



 2013,  triglyceride, serum,  TRIGLYCERIDE  45 mg/dl  <=149    



   fasting          

 

 ,  HDL cholesterol,  HDL  69 mg/dl  >=61    



   serum          

 

 ,  sodium, serum  SODIUM  142 MEQ/L  135-145    



       mmol/L      

 

 ,  potassium, serum  POTASSIUM  4.2 MEQ/L  3.5-5.1    



       mmol/L      

 

 ,  creatinine, serum  CREATININE  1.0 mg/dL  0.5-1.4    



 2013,  urea nitrogen, blood  BUN  18 mg/dL  7-22    



 2013,  urea  BUN/CREAT  18 null  6-25    



   nitrogen/creatinine          



   ratio, serum          

 

 ,  albumin, serum  ALBUMIN  4.3 g/dL  3.5-5.0    



 2013,  calcium, serum  CALCIUM  9.3 mg/dL  8.5-10.5    



 2013,  alanine  SGPT (ALT)  27 U/L  0-65    



   aminotransferase          



   (SGPT), serum          

 

 ,  aspartate  SGOT (AST)  19 U/L  0-37    



   aminotransferase          



   (SGOT), serum          

 

 ,  alkaline  ALK PHOS  80 U/L      



   phosphatase, serum          

 

 ,  thyroid stimulating  TSH  1.570  0.360-3.740    



   hormone, serum    uIU/mL      

 

 ,  prostate specific  PSA  2.54  0.00-4.00    



   antigen    ng/mL      

 

 Sep 25,  prostate specific  PSA  3.3 ng/mL      



   antigen          

 

 Sep 25,  prostate specific  PSA  3.3 ng/mL      



   antigen          

 

 ,  cholesterol, serum  CHOLESTEROL  190 mg/dl  <=199    



 2013,  triglyceride, serum,  TRIGLYCERIDE  45 mg/dl  <=149    



   fasting          

 

 ,  HDL cholesterol,  HDL  69 mg/dl  >=61    



   serum          

 

 ,  LDL cholesterol,  LDL  112 mg/dl  <=99  High  



   serum          

 

 ,  sodium, serum  SODIUM  142 MEQ/L  135-145    



       mmol/L      

 

 ,  potassium, serum  POTASSIUM  4.2 MEQ/L  3.5-5.1    



       mmol/L      

 

 ,  creatinine, serum  CREATININE  1.0 mg/dL  0.5-1.4    



 2013,  urea nitrogen, blood  BUN  18 mg/dL  7-22    



 2013,  urea  BUN/CREAT  18 null  6-25    



   nitrogen/creatinine          



   ratio, serum          

 

 ,  albumin, serum  ALBUMIN  4.3 g/dL  3.5-5.0    



 2013,  calcium, serum  CALCIUM  9.3 mg/dL  8.5-10.5    



 2013,  alanine  SGPT (ALT)  27 U/L  0-65    



   aminotransferase          



   (SGPT), serum          

 

 ,  aspartate  SGOT (AST)  19 U/L  0-37    



   aminotransferase          



   (SGOT), serum          

 

 ,  alkaline  ALK PHOS  80 U/L      



   phosphatase, serum          

 

 Nov ,  thyroid stimulating  TSH  1.570  0.360-3.740    



   hormone, serum    uIU/mL      

 

 Nov 05,  prostate specific  PSA  2.54  0.00-4.00    



   antigen    ng/mL

## 2018-07-09 NOTE — ER
Nurse's Notes                                                                                     

 [facility]                                                                

Name: Anthony Monk                                                                               

Age: 72 yrs                                                                                       

Sex: Male                                                                                         

: 1945                                                                                   

MRN: [id number]                                                                                   

Arrival Date: 2018                                                                          

Time: 14:27                                                                                       

Account#: [id number]                                                                            

Bed 24                                                                                            

Private MD: Mario Alberto Silveira H                                                                    

Diagnosis: Other pneumonia, unspecified organism                                                  

                                                                                                  

Presentation:                                                                                     

                                                                                             

14:44 Presenting complaint: Patient states: coughing up blood x 2 days ago. Pt states         aa5 

      "sometimes it's pinkish and sometimes is dark red blood". Pt states "I also have a pain     

      under my right scapula". Transition of care: patient was not received from another          

      setting of care. Onset of symptoms was 2018. Risk Assessment: Do you want to hurt      

      yourself or someone else? Patient reports no desire to harm self or others. Initial         

      Sepsis Screen: Does the patient meet any 2 criteria? No. Patient's initial sepsis           

      screen is negative. Does the patient have a suspected source of infection? No.              

      Patient's initial sepsis screen is negative. Care prior to arrival: None.                   

14:44 Method Of Arrival: Ambulatory                                                           aa5 

14:44 Acuity: GEORGE 3                                                                           aa5 

                                                                                                  

Triage Assessment:                                                                                

18:37 Respiratory: the patient has mild shortness of breath.                                  kr2 

                                                                                                  

Historical:                                                                                       

- Allergies:                                                                                      

14:46 No Known Allergies;                                                                     aa5 

- PMHx:                                                                                           

14:46 Hypertension;                                                                           aa5 

- PSHx:                                                                                           

14:46 Polyps removed; Hernia repair;                                                          aa5 

                                                                                                  

- Immunization history:: Adult Immunizations unknown.                                             

- Social history:: Smoking status: Patient/guardian denies using tobacco.                         

- Ebola Screening: : No symptoms or risks identified at this time.                                

                                                                                                  

                                                                                                  

Screenin:44 Tuberculosis screening: Possible symptoms: recent bloody sputum, Pt denies night        aa5 

      sweats, denies weight loss.                                                                 

17:10 Abuse screen: Denies threats or abuse. Denies injuries from another. Nutritional        kr2 

      screening: No deficits noted. Fall Risk IV access (20 points).                              

                                                                                                  

Assessment:                                                                                       

17:15 General: Appears in no apparent distress. comfortable, well groomed, well developed,    kr2 

      well nourished, Behavior is calm, cooperative, appropriate for age. Pain: Complains of      

      pain in near right scapula Pain does not radiate. Pain currently is 2 out of 10 on a        

      pain scale. Quality of pain is described as sharp, Is continuous, Alleviated by rest,       

      Aggravated by increased activity, coughing. Neuro: Level of Consciousness is awake,         

      alert, obeys commands, Oriented to person, place, time, situation, Appropriate for age.     

      Cardiovascular: Capillary refill < 3 seconds in bilateral fingers Patient's skin is         

      warm and dry. Rhythm is sinus rhythm with PVC. Respiratory: Airway is patent                

      Respiratory effort is even, unlabored, Respiratory pattern is regular, symmetrical,         

      Onset: The symptoms/episode began/occurred 2-3 days ago. Respiratory: Reports cough         

      that is productive, persistent since 2-3 days ago with streaks of blood in the mucous.      

      GI: Abdomen is flat, non-distended. : Denies burning with urination. EENT: Nares are      

      clear bilaterally Oral mucosa is moist. Derm: Skin is intact, is healthy with good          

      turgor, Skin is pink, warm \T\ dry. Musculoskeletal: Circulation, motion, and sensation     

      intact.                                                                                     

17:15 Respiratory: Breath sounds with crackles bilaterally.                                   kr2 

18:15 Reassessment: Patient appears in no apparent distress at this time. Patient and/or      kr2 

      family updated on plan of care and expected duration. Pain level reassessed. Patient is     

      alert, oriented x 3, equal unlabored respirations, skin warm/dry/pink. Patient denies       

      pain at this time.                                                                          

19:15 Reassessment: Patient appears in no apparent distress at this time. Patient and/or      kr2 

      family updated on plan of care and expected duration. Pain level reassessed. Patient is     

      alert, oriented x 3, equal unlabored respirations, skin warm/dry/pink. Patient denies       

      pain at this time.                                                                          

                                                                                                  

Vital Signs:                                                                                      

14:46  / 79; Pulse 84; Resp 18 S; Temp 98.3(TE); Pulse Ox 96% on R/A; Weight 93.44 kg   aa5 

      (R); Height 6 ft. 3 in. (190.50 cm) (R); Pain 3/10;                                         

18:15  / 89; Pulse 90; Resp 18; Pulse Ox 98% on R/A;                                    kr2 

19:18  / 78; Pulse 91; Resp 20; Pulse Ox 95% on R/A;                                    kr2 

14:46 Body Mass Index 25.75 (93.44 kg, 190.50 cm)                                             aa5 

                                                                                                  

ED Course:                                                                                        

14:27 Patient arrived in ED.                                                                  sb2 

14:28 Mario Alberto Silveira MD is Private Physician.                                               sb2 

14:44 Triage completed.                                                                       aa5 

14:45 Arm band placed on.                                                                     aa5 

16:50 Scar Cooley MD is Attending Physician.                                                

17:07 Daphney Mo, RN is Primary Nurse.                                                     kr2 

17:15 Patient has correct armband on for positive identification. Bed in low position. Call   kr2 

      light in reach. Side rails up X 1. Cardiac monitor on. Pulse ox on. NIBP on. Verbal         

      reassurance given. Head of bed elevated.                                                    

17:15 Inserted saline lock: 20 gauge in left antecubital area, using aseptic technique. Blood kr2 

      collected.                                                                                  

17:26 EKG done, by EKG tech. reviewed by Scar Cooley MD.                                    3 

17:51 X-ray completed. Patient tolerated procedure well. Patient moved back from radiology.     

17:53 XRAY Chest Pa And Lat (2 Views) In Process Unspecified.                                 EDMS

18:36 Patient moved to CT via wheelchair.                                                     nj  

18:37 Urine collected: clean catch specimen, clear.                                           kr2 

18:38 CT completed. Patient tolerated procedure well. Patient moved back from CT.             nj  

18:39 CT Chest For PE Angio In Process Unspecified.                                           EDMS

19:17 No provider procedures requiring assistance completed. IV discontinued, intact,         kr2 

      bleeding controlled, No redness/swelling at site. Pressure dressing applied.                

                                                                                                  

Administered Medications:                                                                         

No medications were administered                                                                  

                                                                                                  

                                                                                                  

Outcome:                                                                                          

19:08 Discharge ordered by MD.                                                                  

19:18 Discharged to home ambulatory.                                                          kr2 

19:18 Condition: good                                                                             

19:18 Discharge instructions given to patient, Instructed on discharge instructions, follow       

      up and referral plans. medication usage, Demonstrated understanding of instructions,        

      follow-up care, medications, Prescriptions given X 2.                                       

19:20 Patient left the ED.                                                                    kr2 

                                                                                                  

Signatures:                                                                                       

Dispatcher [employer]                           EDMS                                                 

Jewels Lima                                                                                  

Connie Long, RN                     RN   aa5                                                  

Talat Caldwell Gregory, MD MD                                                      

Daphney Mo, RN                       RN   kr2                                                  

Jeny Beach                               sb2                                                  

Jana Lomas                              sm3                                                  

                                                                                                  

Corrections: (The following items were deleted from the chart)                                    

14:47 14:44 Presenting complaint: Patient states: coughing up blood x 2 days ago. Pt states   aa5 

      "sometimes it's pinkish and sometimes is dark red blood" aa5                                

                                                                                                  

**************************************************************************************************

## 2018-07-09 NOTE — EDPHYS
Physician Documentation                                                                           

 [facility]                                                                

Name: Anthony Monk                                                                               

Age: 72 yrs                                                                                       

Sex: Male                                                                                         

: 1945                                                                                   

MRN: [id number]                                                                                   

Arrival Date: 2018                                                                          

Time: 14:27                                                                                       

Account#: [id number]                                                                            

Bed 24                                                                                            

Private MD: Mario Alberto Silveira H                                                                    

ED Physician CooleyScar                                                                       

HPI:                                                                                              

                                                                                             

18:49 This 72 yrs old  Male presents to ER via Ambulatory with complaints of         gs  

      Productive Cough - blood, Right Side Pain.                                                  

18:49 The patient or guardian reports cough, described as moderate, with productive sputum,   gs  

      that is bloody, that is yellow. Onset: The symptoms/episode began/occurred 2 day(s)         

      ago. Severity of symptoms: At their worst the symptoms were moderate, in the emergency      

      department the symptoms are unchanged. Modifying factors: The symptoms are alleviated       

      by nothing, the symptoms are aggravated by nothing. Associated signs and symptoms:          

      Pertinent negatives: chest pain, fever. The patient has experienced similar episodes in     

      the past, a few times.                                                                      

                                                                                                  

Historical:                                                                                       

- Allergies:                                                                                      

14:46 No Known Allergies;                                                                     aa5 

- PMHx:                                                                                           

14:46 Hypertension;                                                                           aa5 

- PSHx:                                                                                           

14:46 Polyps removed; Hernia repair;                                                          aa5 

                                                                                                  

- Immunization history:: Adult Immunizations unknown.                                             

- Social history:: Smoking status: Patient/guardian denies using tobacco.                         

- Ebola Screening: : No symptoms or risks identified at this time.                                

                                                                                                  

                                                                                                  

ROS:                                                                                              

18:49 All other systems are negative.                                                         gs  

                                                                                                  

Exam:                                                                                             

18:49 Head/Face:  Normocephalic, atraumatic. Eyes:  Pupils equal round and reactive to light, gs  

      extra-ocular motions intact.  Lids and lashes normal.  Conjunctiva and sclera are           

      non-icteric and not injected.  Cornea within normal limits.  Periorbital areas with no      

      swelling, redness, or edema. ENT:  Nares patent. No nasal discharge, no septal              

      abnormalities noted.  Tympanic membranes are normal and external auditory canals are        

      clear.  Oropharynx with no redness, swelling, or masses, exudates, or evidence of           

      obstruction, uvula midline.  Mucous membranes moist. Neck:  Trachea midline, no             

      thyromegaly or masses palpated, and no cervical lymphadenopathy.  Supple, full range of     

      motion without nuchal rigidity, or vertebral point tenderness.  No Meningismus.             

      Chest/axilla:  Normal chest wall appearance and motion.  Nontender with no deformity.       

      No lesions are appreciated. Cardiovascular:  Regular rate and rhythm with a normal S1       

      and S2.  No gallops, murmurs, or rubs.  Normal PMI, no JVD.  No pulse deficits.             

      Abdomen/GI:  Soft, non-tender, with normal bowel sounds.  No distension or tympany.  No     

      guarding or rebound.  No evidence of tenderness throughout. Back:  No spinal                

      tenderness.  No costovertebral tenderness.  Full range of motion. Skin:  Warm, dry with     

      normal turgor.  Normal color with no rashes, no lesions, and no evidence of cellulitis.     

      MS/ Extremity:  Pulses equal, no cyanosis.  Neurovascular intact.  Full, normal range       

      of motion. Neuro:  Awake and alert, GCS 15, oriented to person, place, time, and            

      situation.  Cranial nerves II-XII grossly intact.  Motor strength 5/5 in all                

      extremities.  Sensory grossly intact.  Cerebellar exam normal.  Normal gait.                

18:49 Constitutional: The patient appears alert, awake.                                           

18:49 ECG was reviewed by the Attending Physician.                                                

18:49 Respiratory: the patient does not display signs of respiratory distress,  Respirations:     

      normal, Breath sounds: rhonchi, that are mild, are scattered.                               

                                                                                                  

Vital Signs:                                                                                      

14:46  / 79; Pulse 84; Resp 18 S; Temp 98.3(TE); Pulse Ox 96% on R/A; Weight 93.44 kg   aa5 

      (R); Height 6 ft. 3 in. (190.50 cm) (R); Pain 3/10;                                         

18:15  / 89; Pulse 90; Resp 18; Pulse Ox 98% on R/A;                                    kr2 

19:18  / 78; Pulse 91; Resp 20; Pulse Ox 95% on R/A;                                    kr2 

14:46 Body Mass Index 25.75 (93.44 kg, 190.50 cm)                                             aa5 

                                                                                                  

MDM:                                                                                              

17:06 Patient medically screened.                                                               

18:49 Differential Diagnosis: Bronchitis Pneumonia Other cad, pe. Data reviewed: vital signs,   

      nurses notes. Response to treatment: the patient's symptoms have mildly improved after      

      treatment, and as a result, I will discharge patient.                                       

                                                                                                  

                                                                                             

17:09 Order name: Basic Metabolic Panel; Complete Time: 17:58                                   

                                                                                             

17:09 Order name: CBC with Diff; Complete Time: 17:58                                           

                                                                                             

17:09 Order name: PT-INR; Complete Time: 17:58                                                gs  

                                                                                             

17:09 Order name: Troponin (emerg Dept Use Only); Complete Time: 17:58                        gs  

                                                                                             

17:09 Order name: D-Dimer; Complete Time: 17:58                                               gs  

                                                                                             

18:37 Order name: Urine Dipstick--Ancillary (enter results); Complete Time: 18:48             eb  

                                                                                             

17:09 Order name: EKG; Complete Time: 17:09                                                   gs  

                                                                                             

17:09 Order name: Cardiac monitoring; Complete Time: 17:54                                    gs  

07                                                                                             

17:09 Order name: EKG - Nurse/Tech; Complete Time: 17:55                                      gs  

07                                                                                             

17:09 Order name: IV Saline Lock; Complete Time: 17:55                                        gs  

                                                                                             

17:09 Order name: Labs collected and sent; Complete Time: 17:55                               gs  

07                                                                                             

17:09 Order name: O2 Per Protocol; Complete Time: 17:55                                       gs  

[id number]                                                                                             

17:09 Order name: XRAY Chest Pa And Lat (2 Views); Complete Time: 18:16                       gs  

                                                                                             

18:17 Order name: CT Chest For PE Angio; Complete Time: 18:48                                 gs  

                                                                                             

17:09 Order name: O2 Sat Monitoring; Complete Time: 17:55                                     gs  

                                                                                             

17:09 Order name: Urine Dipstick-Ancillary (obtain specimen); Complete Time: 18:32            gs  

                                                                                                  

EC:49 Rate is 87 beats/min. Rhythm is irregularly irregular. QRS interval is normal. QT       gs  

      interval is normal. T waves are Normal. No ST changes noted. Clinical impression:           

      Abnormal EKG without significant change and Atrial Fibrillation. Interpreted by me.         

                                                                                                  

Administered Medications:                                                                         

No medications were administered                                                                  

                                                                                                  

                                                                                                  

Disposition:                                                                                      

18 19:08 Discharged to Home. Impression: Other pneumonia, unspecified organism.             

- Condition is Stable.                                                                            

- Discharge Instructions: Pneumonia, Adult.                                                       

- Prescriptions for Levaquin 750 mg Oral Tablet - take 1 tablet by ORAL route once                

  daily for 5 days; 5 tablet. Albuterol Sulfate 90 mcg/actuation - inhale 1-2 puff by             

  INHALATION route every 4-6 hours; 1 Inhaler.                                                    

- Medication Reconciliation Form, Thank You Letter, Antibiotic Education, Prescription            

  Opioid Use form.                                                                                

- Follow up: Private Physician; When: 2 - 3 days; Reason: Re-evaluation by your                   

  physician.                                                                                      

                                                                                                  

                                                                                                  

                                                                                                  

Signatures:                                                                                       

Dispatcher [facility] Center[facility], Connie, RN                     RN   aa5                                                  

Scar Cooley MD MD   gs                                                   

Daphney Mo RN                       RN   kr2                                                  

                                                                                                  

Corrections: (The following items were deleted from the chart)                                    

19:20 19:08 2018 19:08 Discharged to Home. Impression: Other pneumonia, unspecified     kr2 

      organism. Condition is Stable. Forms are Medication Reconciliation Form, Thank You          

      Letter, Antibiotic Education, Prescription Opioid Use. Follow up: Private Physician;        

      When: 2 - 3 days; Reason: Re-evaluation by your physician. gs                               

                                                                                                  

**************************************************************************************************

## 2018-07-09 NOTE — XMS REPORT
Continuity of Care Document

 Created on:2015



Patient:LEISA SEPULVEDA

Sex:Male

:1945

External Reference #:[id number]





Demographics







 [address]  [address] [place] [address]

 

 Phone  [phone]

 

 Preferred Language  Unknown

 

 Marital Status  Other

 

 Adventism Affiliation  Unknown

 

 Race  Unknown

 

 Ethnic Group  Unknown









Author







 Organization  [facility]









Care Team Providers







 Name  Role  Phone

 

 MD Crys, Enmanuel  Unavailable  Unavailable









Insurance Providers







 Payer name  Policy type /  Policy ID  Covered party ID  Policy Tran



   Coverage type      

 

 [place]        



 SOLUTIONS        

 

 [employer] - [employer] -        



 CHOICE (POS II)        

 

 [employer] - [employer] -        



 CHOICE (POS II)        

 

 [place]        



 SOLUTIONS        

 

 [employer] - [employer]        



 (MEDICARE REPLACEMENT P        

 

 [employer] - [employer] -        



 CHOICE (POS II)        

 

 [place]        



 SOLUTIONS        

 

 [employer] - [employer] -        



 CHOICE (POS II)        

 

 [place]: [place]        



 SOLUTIONS        







Encounters







 Encounter  Performer  Location  Date

 

 Lab Report  Enmanuel Spangler MD  [facility]  2015







Problems







 Problem  Effective Dates  Problem Status

 

 ATRIAL FIBRILLATION    Active

 

 ESSENTIAL HYPERTENSION, BENIGN    Active

 

 OSTEOPENIA    Active

 

 COPD    Active

 

 ASTHMA, UNSPECIFIED, UNSPECIFIED STATUS    Active

 

 BENIGN PROSTATIC HYPERTROPHY, WITH URINARY OBSTRUCTION    Active

 

 ARTHRITIS, KNEES, BILATERAL    Active

 

 PERIPHERAL NEUROPATHY    Active

 

 PREVENTIVE HEALTH CARE  2013  Active







Procedures







 Date  Description  Comments

 

 Sep 10, 2010  bone density  Done

 

 Sep 10, 2010  bone density  Done

 

 2013  smoking status  current every day smoker

 

 Sep 20, 2010  bone density  Complete std dev

 

 2015  smoking status  Former smoker







Medications







 Medication  Instructions  Start Date  Status

 

 NAPROXEN SODIUM 550 MG TABS  PRN    Inactive

 

 LOTREL 5-20 MG CAPS  1 tablet twice day  2013  Active

 

 NAPROXEN 500 MG TABS  1 tablet daily  2013  Active

 

 MULTAQ 400 MG TABS  1 tablet twice day  2013  Active

 

 PRADAXA 150 MG CAPS  1 tablet daily  2013  Active

 

 CENTRUM SILVER ULTRA MENS TABS    2013  Active

 

 SUPER B-COMPLEX CAPS    2013  Active

 

 DEXILANT 30 MG CPDR    2013  Active







Vital Signs







 Date  Description  Test  Result

 

 2013  weight E&M - 3141-9  WEIGHT  200 lb

 

 2013  temperature E&M  TEMPERATURE  97.0 deg f

 

 2013  pulse rate E&M - 8867-4  PULSE RATE  68 /min

 

 2013  blood pressure, systolic - 8480-6  BP SYSTOLIC  150 mm Hg

 

 2013  blood pressure, diastolic - 8462-4  BP DIASTOLIC  80 mm Hg

 

 2013  height E&M - 8302-2  HEIGHT  73 in

 

 2015  weight E&M - 3141-9  WEIGHT  204 lb

 

 2015  temperature E&M  TEMPERATURE  98.1 deg f

 

 2015  pulse rate E&M - 8867-4  PULSE RATE  74 /min

 

 2015  blood pressure, systolic - 8480-6  BP SYSTOLIC  146 mm Hg

 

 2015  blood pressure, diastolic - 8462-4  BP DIASTOLIC  78 mm Hg

 

 2015  height E&M - 8302-2  HEIGHT  73 in







Results







 Date  Description  Test Name  Value  Reference  Interpretation  Status

 

 ,  hemoglobin, blood  HGB  15.4 g/dL  14.0-18.0    



 2013,  hematocrit, blood  HCT  45.7 %  42.0-54.0    



 2013,  platelet count  PLATELETS  177 K/CMM  133-450    



       /mm3      

 

 ,  hemoglobin, blood  HGB  15.4 g/dL  14.0-18.0    



 2013,  hematocrit, blood  HCT  45.7 %  42.0-54.0    



 2013,  platelet count  PLATELETS  177 K/CMM  133-450    



       /mm3      

 

 ,  hemoglobin, blood  HGB  16.0 g/dL  14.0-18.0    



 2015,  hematocrit, blood  HCT  46.0 %  42.0-54.0    



 2015,  platelet count  PLATELETS  193 K/CMM  133-450    



       /mm3      

 

 ,  cholesterol, serum  CHOLESTEROL  190 mg/dl  <=199    



 2013,  triglyceride, serum,  TRIGLYCERIDE  45 mg/dl  <=149    



   fasting          

 

 ,  HDL cholesterol,  HDL  69 mg/dl  >=61    



   serum          

 

 ,  sodium, serum  SODIUM  142 MEQ/L  135-145    



       mmol/L      

 

 ,  potassium, serum  POTASSIUM  4.2 MEQ/L  3.5-5.1    



       mmol/L      

 

 ,  creatinine, serum  CREATININE  1.0 mg/dL  0.5-1.4    



 2013,  urea nitrogen, blood  BUN  18 mg/dL  7-22    



 2013,  urea  BUN/CREAT  18 null  6-25    



   nitrogen/creatinine          



   ratio, serum          

 

 ,  albumin, serum  ALBUMIN  4.3 g/dL  3.5-5.0    



 2013,  calcium, serum  CALCIUM  9.3 mg/dL  8.5-10.5    



 2013,  alanine  SGPT (ALT)  27 U/L  0-65    



   aminotransferase          



   (SGPT), serum          

 

 ,  aspartate  SGOT (AST)  19 U/L  0-37    



   aminotransferase          



   (SGOT), serum          

 

 ,  alkaline  ALK PHOS  80 U/L      



   phosphatase, serum          

 

 ,  thyroid stimulating  TSH  1.570  0.360-3.740    



   hormone, serum    uIU/mL      

 

 ,  prostate specific  PSA  2.54  0.00-4.00    



   antigen    ng/mL      

 

 Sep 25,  prostate specific  PSA  3.3 ng/mL      



   antigen          

 

 Sep 25,  prostate specific  PSA  3.3 ng/mL      



   antigen          

 

 ,  cholesterol, serum  CHOLESTEROL  190 mg/dl  <=199    



 2013,  triglyceride, serum,  TRIGLYCERIDE  45 mg/dl  <=149    



   fasting          

 

 ,  HDL cholesterol,  HDL  69 mg/dl  >=61    



   serum          

 

 ,  LDL cholesterol,  LDL  112 mg/dl  <=99  High  



 2013  serum          

 

 Nov 05,  sodium, serum  SODIUM  142 MEQ/L  135-145    



       mmol/L      

 

 ,  potassium, serum  POTASSIUM  4.2 MEQ/L  3.5-5.1    



       mmol/L      

 

 ,  creatinine, serum  CREATININE  1.0 mg/dL  0.5-1.4    



 2013,  urea nitrogen, blood  BUN  18 mg/dL  7-22    



 2013,  urea  BUN/CREAT  18 null  6-25    



   nitrogen/creatinine          



   ratio, serum          

 

 ,  albumin, serum  ALBUMIN  4.3 g/dL  3.5-5.0    



 2013,  calcium, serum  CALCIUM  9.3 mg/dL  8.5-10.5    



 2013,  alanine  SGPT (ALT)  27 U/L  0-65    



   aminotransferase          



   (SGPT), serum          

 

 ,  aspartate  SGOT (AST)  19 U/L  0-37    



   aminotransferase          



   (SGOT), serum          

 

 ,  alkaline  ALK PHOS  80 U/L      



   phosphatase, serum          

 

 Nov ,  thyroid stimulating  TSH  1.570  0.360-3.740    



   hormone, serum    uIU/mL      

 

 ,  prostate specific  PSA  2.54  0.00-4.00    



   antigen    ng/mL      

 

 ,  cholesterol, serum  CHOLESTEROL  159 mg/dl  <=199    



 2015,  triglyceride, serum,  TRIGLYCERIDE  48 mg/dl  <=149    



   fasting          

 

 ,  HDL cholesterol,  HDL  63 mg/dl  >=61    



   serum          

 

 ,  LDL cholesterol,  LDL  86 mg/dl  <=99    



 2015  serum          

 

 ,  sodium, serum  SODIUM  140 MEQ/L  135-145    



       mmol/L      

 

 ,  potassium, serum  POTASSIUM  4.6 MEQ/L  3.5-5.1    



       mmol/L      

 

 ,  creatinine, serum  CREATININE  1.2 mg/dL  0.5-1.4    



 2015,  urea nitrogen, blood  BUN  22 mg/dL  7-22    



 2015,  urea  BUN/CREAT  18 null  6-25    



   nitrogen/creatinine          



   ratio, serum          

 

 ,  albumin, serum  ALBUMIN  4.2 g/dL  3.5-5.0    



 2015,  calcium, serum  CALCIUM  9.0 mg/dL  8.5-10.5    



 2015,  alanine  SGPT (ALT)  25 U/L  0-65    



   aminotransferase          



   (SGPT), serum          

 

 ,  aspartate  SGOT (AST)  23 U/L  0-37    



   aminotransferase          



   (SGOT), serum          

 

 ,  alkaline  ALK PHOS  78 U/L      



   phosphatase, serum          

 

 ,  thyroid stimulating  TSH  1.960  0.360-3.740    



   hormone, serum    uIU/mL      

 

 ,  prostate specific  PSA  2.46  0.00-4.00    



 2015  antigen    ng/mL

## 2018-07-09 NOTE — RAD REPORT
EXAM DESCRIPTION:  CT - Chest For Pe Angio - 7/9/2018 6:39 pm

 

CLINICAL HISTORY:  Chest pain.

COUGH

 

COMPARISON:  Chest Pa And Lat (2 Views) dated 7/9/2018

 

TECHNIQUE:  CT angiogram of the pulmonary arteries was performed with MIP.

 

All CT scans are performed using dose optimization technique as appropriate and may include automated
 exposure control or mA/KV adjustment according to patient size.

 

FINDINGS:  No evidence of pulmonary thromboembolism.

 

No acute aortic finding demonstrated. The heart is mildly enlarged in size.

 

Emphysematous changes are present with a vague ill-defined infiltrate seen in the posterior right upp
er lobe, suspicious for mild developing pneumonia.

 

Small right pleural effusion is noted.

 

No concerning bony finding.

 

IMPRESSION:  No evidence of pulmonary thromboembolism.

 

COPD with mild developing infiltrate suspected in the posterior right upper lobe.

 

Small right pleural effusion.

## 2018-07-09 NOTE — XMS REPORT
Summary of Care: 3/15/18 - 3/15/18

 Created on:May 14, 2118



Patient:LEISA SEPULVEDA

Sex:Male

:1945

External Reference #:[id number]





Demographics







 [place] [address]

 

 Home Phone  [phone]

 

 Email Address  [email or web address]

 

 Preferred Language  English

 

 Marital Status  Unknown

 

 Pentecostalism Affiliation  Unknown

 

 Race  White

 

 Additional Race(s)  Unavailable

 

 Ethnic Group  Not  or 









Author







 Organization  [facility]

 

 Address  [address]



   [place] [address]-

 

 Phone  [phone]









Encounter

HQ Quin_jaylyn(FIN) [id number] Date(s): 3/15/18 - 3/15/18

[facility] [address] [place], [place] [address] 

Discharge Disposition: Home or Self Care

Attending Physician: Enmanuel Spangler MD





Vital Signs







 Most recent to oldest [Reference Range]:  1

 

 Height  190.5 cm



   (3/15/18 1:48 PM)

 

 Temperature Oral [96.4-99.1 DegF]  98.5 DegF



   (3/15/18 1:48 PM)

 

 Blood Pressure [/60-90 mmHg]  141/80 mmHg



   *HI*



   (3/15/18 1:48 PM)

 

 Peripheral Pulse Rate [ bpm]  85 bpm



   (3/15/18 1:48 PM)

 

 Weight  94.636 kg



   (3/15/18 1:48 PM)

 

 Body Mass Index  26.08 m2



   (3/15/18 1:48 PM)







Problem List







 Condition  Effective Dates  Status  Health Status  Informant

 

 Arthritis(Confirmed)    Resolved    

 

 Arthritis of knee1    Active    

 

 Asthma2    Active    

 

 Atrial fibrillation3    Active    

 

 Back strain(Confirmed)    Resolved    

 

 Benign essential hypertension4    Active    

 

 Benign prostatic hypertrophy with    Active    



 outflow obstruction5        

 

 Broken forearm(Confirmed)6    Resolved    

 

 Bronchitis(Confirmed)    Resolved    

 

 Chronic obstructive lung disease7    Active    

 

 External hemorrhoid(Confirmed)    Resolved    

 

 Fatigue(Confirmed)    Resolved    

 

 Osteopenia8    Active    

 

 Peripheral autonomic neuropathy of    Resolved    



 unknown cause(Confirmed)        

 

 Peripheral nerve disease9    Active    

 

 Viral pneumonia(Confirmed)    Resolved    



1Data migrated from [employer] on 5/30/15.2Data migrated from [employer] 
on 5/30/15.3Data migrated from [employer] on 5/30/15.4Data migrated from [employer] on 5/30/15.5Data migrated from [employer] on 5/30/15.2Shsd2Kjig 
migrated from [employer] on 5/30/15.8Data migrated from [employer] on 5/30
/15.9Data migrated from [employer] on 5/30/15.



Allergies, Adverse Reactions, Alerts







 Substance  Reaction  Severity  Status

 

 NKDA1      Active



1Data migrated from [employer] on 16. Originally documented as NKA.



Medications

amLODIPine-benazepril 10 mg-40 mg oral capsule

See Instructions, TAKE 1 CAPSULE DAILY, # 90 tab, 3 Refill(s), Pharmacy: [facility]

Start Date: 3/15/18

Status: Orderednaproxen 500 mg oral tablet

500 mg=1 tab, PO, PRN, PRN Pain Score 6-10, # 30 caplet, 6 Refill(s), Pharmacy: 
[facility]

Start Date: 3/15/18

Status: Ordered



Results

No data available for this section



Immunizations

No data available for this section



Procedures







 Procedure  Date  Related Diagnosis  Body Site  Status

 

 Collection of venous blood by  3/15/18      Completed



 venipuncture        

 

 Procedure on eye        Completed

 

 Procedure on wrist        Completed

 

 Sebaceous cyst removal1        Completed



1Cyst removed from back



Social History







 Social History Type  Response

 

 Substance Abuse  Use: None.

 

 Employment/School  Status: Retired.1

 

 Alcohol  Current, Type Wine.  Frequency: 1-2 times per week.

 

 Smoking Status  Former smoker; Exposure to Tobacco Smoke None; Cigarette 
Smoking Last 365 Days No; Reg Smoking Cessation Counseling No



   entered on: 3/15/18



1Surrogate decision maker- Wife- Carole Lacho [phone]



Assessment and Plan

No data available for this section

## 2021-09-10 ENCOUNTER — HOSPITAL ENCOUNTER (EMERGENCY)
Dept: HOSPITAL 97 - ER | Age: 76
Discharge: HOME | End: 2021-09-10
Payer: COMMERCIAL

## 2021-09-10 VITALS — OXYGEN SATURATION: 92 % | DIASTOLIC BLOOD PRESSURE: 76 MMHG | SYSTOLIC BLOOD PRESSURE: 99 MMHG

## 2021-09-10 VITALS — TEMPERATURE: 99.2 F

## 2021-09-10 DIAGNOSIS — I10: ICD-10-CM

## 2021-09-10 DIAGNOSIS — U07.1: Primary | ICD-10-CM

## 2021-09-10 DIAGNOSIS — I48.91: ICD-10-CM

## 2021-09-10 LAB
ALBUMIN SERPL BCP-MCNC: 3.5 G/DL (ref 3.4–5)
ALP SERPL-CCNC: 80 U/L (ref 45–117)
ALT SERPL W P-5'-P-CCNC: 33 U/L (ref 12–78)
AST SERPL W P-5'-P-CCNC: 56 U/L (ref 15–37)
BLD SMEAR INTERP: (no result)
BUN BLD-MCNC: 31 MG/DL (ref 7–18)
CRP SERPL-MCNC: 113 MG/L (ref ?–3)
FERRITIN SERPL-MCNC: 945.7 NG/ML (ref 26–388)
GLUCOSE SERPLBLD-MCNC: 91 MG/DL (ref 74–106)
HCT VFR BLD CALC: 46.5 % (ref 39.6–49)
INR BLD: 1.35
LIPASE SERPL-CCNC: 144 U/L (ref 73–393)
LYMPHOCYTES # SPEC AUTO: 0.8 K/UL (ref 0.7–4.9)
MORPHOLOGY BLD-IMP: (no result)
PMV BLD: 9.1 FL (ref 7.6–11.3)
POTASSIUM SERPL-SCNC: 4.8 MMOL/L (ref 3.5–5.1)
RBC # BLD: 4.91 M/UL (ref 4.33–5.43)
TROPONIN (EMERG DEPT USE ONLY): < 0.02 NG/ML (ref 0–0.04)

## 2021-09-10 PROCEDURE — 86140 C-REACTIVE PROTEIN: CPT

## 2021-09-10 PROCEDURE — 96365 THER/PROPH/DIAG IV INF INIT: CPT

## 2021-09-10 PROCEDURE — 80076 HEPATIC FUNCTION PANEL: CPT

## 2021-09-10 PROCEDURE — 96361 HYDRATE IV INFUSION ADD-ON: CPT

## 2021-09-10 PROCEDURE — 85025 COMPLETE CBC W/AUTO DIFF WBC: CPT

## 2021-09-10 PROCEDURE — 85730 THROMBOPLASTIN TIME PARTIAL: CPT

## 2021-09-10 PROCEDURE — 84145 PROCALCITONIN (PCT): CPT

## 2021-09-10 PROCEDURE — 85379 FIBRIN DEGRADATION QUANT: CPT

## 2021-09-10 PROCEDURE — 36415 COLL VENOUS BLD VENIPUNCTURE: CPT

## 2021-09-10 PROCEDURE — 99284 EMERGENCY DEPT VISIT MOD MDM: CPT

## 2021-09-10 PROCEDURE — 85610 PROTHROMBIN TIME: CPT

## 2021-09-10 PROCEDURE — 71275 CT ANGIOGRAPHY CHEST: CPT

## 2021-09-10 PROCEDURE — 93005 ELECTROCARDIOGRAM TRACING: CPT

## 2021-09-10 PROCEDURE — 96375 TX/PRO/DX INJ NEW DRUG ADDON: CPT

## 2021-09-10 PROCEDURE — 71045 X-RAY EXAM CHEST 1 VIEW: CPT

## 2021-09-10 PROCEDURE — 74177 CT ABD & PELVIS W/CONTRAST: CPT

## 2021-09-10 PROCEDURE — 80048 BASIC METABOLIC PNL TOTAL CA: CPT

## 2021-09-10 PROCEDURE — 82728 ASSAY OF FERRITIN: CPT

## 2021-09-10 PROCEDURE — 84484 ASSAY OF TROPONIN QUANT: CPT

## 2021-09-10 PROCEDURE — 83690 ASSAY OF LIPASE: CPT

## 2021-09-10 NOTE — EDPHYS
Physician Documentation                                                                           

 [facility]                                                                 

Name: Anthony Monk                                                                               

Age: 76 yrs                                                                                       

Sex: Male                                                                                         

: 1945                                                                                   

MRN: [id number]                                                                                   

Arrival Date: 09/10/2021                                                                          

Time: 12:55                                                                                       

Account#: [id number]                                                                            

Bed 11                                                                                            

Private MD:                                                                                       

ED Physician Avinash Araujo                                                                       

HPI:                                                                                              

09/10                                                                                             

16:00 This 76 yrs old  Male presents to ER via Wheelchair with complaints of Covid   cp  

      +- Dehydration.                                                                             

16:00 The patient has shortness of breath with light activity. Onset: The symptoms/episode    cp  

      began/occurred gradually.                                                                   

16:00 The patient presents to the emergency department with diarrhea, that is continuous.     cp  

      Patient reports he received positive test results for COVID-19 today and that symptoms      

      started with sore throat on 2021. Patient reports receiving J\T\J vaccine in 2021. Wife recently tested positive for COVID-19. Patient c/o continues diarrhea.           

                                                                                                  

Historical:                                                                                       

- Allergies:                                                                                      

13:33 No Known Allergies;                                                                     iw  

- Home Meds:                                                                                      

13:33 Pradaxa oral [Active]; amlodipine oral [Active]; Metoprolol Tartrate Oral [Active];     iw  

- PMHx:                                                                                           

13:33 Hypertension;                                                                           iw  

15:50 Atrial fibrillation;                                                                    cp  

- PSHx:                                                                                           

13:33 None;                                                                                   iw  

                                                                                                  

- Immunization history:: Client reports receiving the Caesar \T\ Caesar single-dose             

  vaccine.                                                                                        

- Social history:: Smoking status: Patient/guardian denies using tobacco, the patient             

  reports quitting approximately 10 years ago.                                                    

                                                                                                  

                                                                                                  

ROS:                                                                                              

16:05 Constitutional: Positive for poor PO intake, Negative for body aches, chills, fever.    cp  

16:05 Cardiovascular: Negative for chest pain, edema, palpitations.                           cp  

16:05 Respiratory: Positive for cough, with no reported sputum, shortness of breath, at rest.     

      Negative for wheezing.                                                                      

16:05 Eyes: Negative for injury, pain, redness, and discharge.                                cp  

16:05 ENT: Negative for drainage from ear(s), ear pain, sore throat, difficulty swallowing,   cp  

      difficulty handling secretions.                                                             

16:05 Abdomen/GI: Positive for diarrhea, anorexia, Negative for vomiting, constipation,           

      black/tarry stool, rectal bleeding.                                                         

16:05 Neuro: Positive for weakness, Negative for altered mental status, dizziness, headache,      

      syncope.                                                                                    

16:05 All other systems are negative.                                                             

                                                                                                  

Exam:                                                                                             

16:02 ECG was reviewed by the Attending Physician.                                            cp  

16:10 Constitutional: The patient appears in no acute distress, alert, awake,                 cp  

      non-diaphoretic, non-toxic, well developed, well nourished.                                 

16:10 Head/Face:  Normocephalic, atraumatic.                                                  cp  

16:10 Eyes: Periorbital structures: appear normal, Conjunctiva: normal, no exudate, no            

      injection, Sclera: no appreciated abnormality, Lids and lashes: appear normal,              

      bilaterally.                                                                                

16:10 ENT: External ear(s): are unremarkable, Nose: is normal, Mouth: Lips: dry, Oral mucosa:     

      moist, Posterior pharynx: Airway: no evidence of obstruction, patent.                       

16:10 Chest/axilla: Inspection: normal, Palpation: is normal, no crepitus, no tenderness.         

16:10 Cardiovascular: Rate: normal, Rhythm: irregular, Edema: is not appreciated, JVD: is not     

      appreciated.                                                                                

16:10 Respiratory: the patient does not display signs of respiratory distress,  Respirations:     

      labored breathing, is not present, shallow respirations, that is mild, Breath sounds:       

      decreased breath sounds, are not appreciated, stridor, is not appreciated, wheezing: is     

      not appreciated.                                                                            

16:10 Abdomen/GI: Inspection: abdomen appears normal, Bowel sounds: active, all quadrants,        

      Palpation: soft, in all quadrants, mild abdominal tenderness, in the right lower            

      quadrant, rebound tenderness, is not appreciated, involuntary guarding, is not              

      appreciated.                                                                                

16:10 Back: pain, is absent, ROM is normal.                                                       

16:10 Neuro: Orientation: to person, place \T\ time. Mentation: is normal, Motor: moves all       

      fours, strength is normal.                                                                  

                                                                                                  

Vital Signs:                                                                                      

13:30 BP 95 / 60; Pulse 90; Resp 20; Temp 100.1(TE); Pulse Ox 96% on R/A; Weight 76.66 kg;    iw  

      Height 6 ft. 3 in. (190.50 cm);                                                             

16:10  / 69; Pulse 97; Resp 28 S; Temp 99.2(TE); Pulse Ox 93% on R/A;                   aa5 

18:50 BP 99 / 76; Pulse 90; Resp 30 S; Pulse Ox 92% on R/A;                                   aa5 

19:25 BP 96 / 70; Pulse 84; Resp 26 S; Pulse Ox 91% on R/A;                                   aa5 

21:15  / 75; Pulse 95; Resp 21; Temp 97.7(TE); Pulse Ox 91% on R/A;                     oe  

13:30 Body Mass Index 21.12 (76.66 kg, 190.50 cm)                                               

                                                                                                  

MDM:                                                                                              

15:49 Patient medically screened.                                                               

17:00 Differential diagnosis: gastritis, gastroenteritis, dehydration, sepsis pneumonia,      cp  

      pulmonary edema, Pulmonary Embolism Sepsis.                                                 

17:40 Data reviewed: vital signs, nurses notes, lab test result(s), EKG, radiologic studies,  cp  

      CT scan, plain films.                                                                       

17:40 Test interpretation: by ED physician or midlevel provider: ECG, plain radiologic        cp  

      studies.                                                                                    

18:00 Transition of care: After a detail discussion of the patient's case, care is            cp  

      transferred to [facility] MONTERO.                                                             

18:00 ED course: Will continue to monitor patient and treat with Regen-COV.                     

                                                                                                  

09/10                                                                                             

13:35 Order name: CBC with Diff; Complete Time: 17:31                                           

09/10                                                                                             

15:37 Interpretation: Normal except: MCV 94.7; PLT 92; LOIS% 79.3; LYM% 13.3.                    

09/10                                                                                             

13:35 Order name: Basic Metabolic Panel; Complete Time: 17:09                                   

09/10                                                                                             

17:10 Interpretation: Normal except: ; BUN 31; CRE 1.43; GFR 48.                          

09/10                                                                                             

13:45 Order name: C-Reactive Protein; Complete Time: 15:37                                      

09/10                                                                                             

16:20 Interpretation: Abnormal: C-REACTIVE PROT 113.00.                                         

09/10                                                                                             

13:45 Order name: D-Dimer; Complete Time: 15:37                                                 

09/10                                                                                             

13:45 Order name: Ferritin; Complete Time: 15:37                                                

09/10                                                                                             

13:45 Order name: LFT's; Complete Time: 15:37                                                   

09/10                                                                                             

16:19 Interpretation: Normal except: AST 56; BILID 0.3; GLOB 3.6; A/G 1.0.                      

09/10                                                                                             

13:45 Order name: Lipase; Complete Time: 15:37                                                  

09/10                                                                                             

13:45 Order name: PT-INR; Complete Time: 15:37                                                  

09/10                                                                                             

13:45 Order name: Procalcitonin; Complete Time: 15:37                                           

09/10                                                                                             

13:45 Order name: Ptt, Activated; Complete Time: 15:37                                        iw  

09/10                                                                                             

13:45 Order name: Troponin (emerg Dept Use Only); Complete Time: 15:37                        iw  

09/10                                                                                             

13:45 Order name: CXR XRAY; Complete Time: 15:37                                              iw  

09/10                                                                                             

15:39 Order name: CT Chest For PE Angio; Complete Time: 17:31                                 cp  

09/10                                                                                             

17:14 Order name: CBC Smear Scan; Complete Time: 17:31                                        EDMS

09/10                                                                                             

13:45 Order name: EKG; Complete Time: 13:45                                                   iw  

09/10                                                                                             

13:45 Order name: Cardiac monitoring; Complete Time: 15:49                                    iw  

09/10                                                                                             

13:45 Order name: EKG - Nurse/Tech; Complete Time: 16:04                                      iw  

09/10                                                                                             

13:45 Order name: IV Start; Complete Time: 15:49                                              iw  

09/10                                                                                             

13:45 Order name: Labs collected and sent; Complete Time: 15:49                               iw  

09/10                                                                                             

13:45 Order name: O2 Per Protocol; Complete Time: 15:49                                       iw  

09/10                                                                                             

13:45 Order name: O2 Sat Monitoring; Complete Time: 15:49                                     iw  

09/10                                                                                             

15:39 Order name: CT Abd/Pelvis - IV Contrast Only; Complete Time: 17:31                      cp  

09/10                                                                                             

16:20 Order name: INCENTIVE SPIROMETRY                                                        cp  

09/10                                                                                             

17:45 Order name: INCENTIVE SPIROMETRY                                                        cp  

                                                                                                  

EC:02 Rate is 98 beats/min. Rhythm is irregular. QRS interval is normal. QT interval is       cp  

      normal. T waves are Inverted in lead aVR. Interpreted by me. Reviewed by me.                

                                                                                                  

Administered Medications:                                                                         

14:00 Drug: NS 0.9% 500 ml Route: IV; Rate: bolus; Site: left forearm;                        iw  

15:30 Follow up: IV Status: Completed infusion; IV Intake: 500ml                              aa5 

16:10 Drug: SOLU-Medrol (methylPrednisoLONE) 125 mg Route: IVP; Site: left antecubital;       aa5 

16:15 Follow up: Response: No adverse reaction                                                aa5 

17:31 CANCELLED (Physician Discretion): NS 0.9% 500 ml IV at 250 ml/hr continuous             aa5 

18:45 Drug: NS 0.9% 500 ml Route: IV; Rate: 50 ml/hr; Site: right wrist;                      aa5 

18:45 Drug: REGEN-COV Dose Pack 120 mg/mL-120 mg/mL (EUA) 260 ml Route: IV; Rate: calculated  aa5 

      rate; Site: right wrist;                                                                    

19:50 Follow up: Response: No adverse reaction; IV Status: Completed infusion                 aa5 

                                                                                                  

                                                                                                  

Disposition:                                                                                      

22:08 Co-signature as Attending Physician, Avinash Araujo MD I agree with the assessment and   kdr 

      plan of care.                                                                               

                                                                                                  

Disposition Summary:                                                                              

09/10/21 20:58                                                                                    

Discharge Ordered                                                                                 

      Location: Home                                                                          jm 

      Condition: Stable                                                                       jm 

      Diagnosis                                                                                   

        - Coronavirus infection, unspecified                                                  jm 

      Followup:                                                                               jm 

        - With: Private Physician                                                                  

        - When: 2 - 3 days                                                                         

        - Reason: Recheck today's complaints, Continuance of care, Re-evaluation by your           

      physician                                                                                   

      Discharge Instructions:                                                                     

        - Discharge Summary Sheet                                                             [facility] 

        - COVID-19                                                                            [facility] 

      Forms:                                                                                      

        - Medication Reconciliation Form                                                      [facility] 

        - Thank You Letter                                                                    [facility] 

        - Antibiotic Education                                                                [facility] 

        - Prescription Opioid Use                                                             [facility] 

Signatures:                                                                                       

Dispatcher [employer]                           EDMS                                                 

Avinash Araujo MD MD kdr Mickail, Joel, PA PA   jmm                                                  

Lilibeth Jose RN RN                                                      

Connie Long RN                     RN   aa5                                                  

Greg Payne PA PA   cp                                                   

                                                                                                  

Corrections: (The following items were deleted from the chart)                                    

17:31 17:11 NS 0.9% 500 ml IV at 250 ml/hr continuous ordered. cp                             aa5 

                                                                                                  

**************************************************************************************************

## 2021-09-10 NOTE — ER
Nurse's Notes                                                                                     

 [facility]                                                                 

Name: Anthony Monk                                                                               

Age: 76 yrs                                                                                       

Sex: Male                                                                                         

: 1945                                                                                   

MRN: [id number]                                                                                   

Arrival Date: 09/10/2021                                                                          

Time: 12:55                                                                                       

Account#: [id number]                                                                            

Bed 11                                                                                            

Private MD:                                                                                       

Diagnosis: Coronavirus infection, unspecified                                                     

                                                                                                  

Presentation:                                                                                     

09/10                                                                                             

13:30 Chief complaint: Patient states: tested positive for COVID today, symptoms started 5    iw  

      days ago, has had diarrhea, body aches, no vomiting, +cough , feels dehydrated.             

      Coronavirus screen: Client presents with at least one sign or symptom that may indicate     

      coronavirus-19. Client reports previous positive COVID test result. Ebola Screen:           

      Patient negative for fever greater than or equal to 101.5 degrees Fahrenheit, and           

      additional compatible Ebola Virus Disease symptoms Patient denies exposure to               

      infectious person. Patient denies travel to an Ebola-affected area in the 21 days           

      before illness onset. No symptoms or risks identified at this time. Initial Sepsis          

      Screen: Does the patient meet any 2 criteria? No. Patient's initial sepsis screen is        

      negative. Does the patient have a suspected source of infection? No. Patient's initial      

      sepsis screen is negative. Risk Assessment: Do you want to hurt yourself or someone         

      else? Patient reports no desire to harm self or others.                                     

13:30 Method Of Arrival: Wheelchair                                                           iw  

13:30 Acuity: GEORGE 3                                                                           iw  

                                                                                                  

Historical:                                                                                       

- Allergies:                                                                                      

13:33 No Known Allergies;                                                                     iw  

- Home Meds:                                                                                      

13:33 Pradaxa oral [Active]; amlodipine oral [Active]; Metoprolol Tartrate Oral [Active];     iw  

- PMHx:                                                                                           

13:33 Hypertension;                                                                           iw  

15:50 Atrial fibrillation;                                                                    cp  

- PSHx:                                                                                           

13:33 None;                                                                                   iw  

                                                                                                  

- Immunization history:: Client reports receiving the Caesar \T\ Caesar single-dose             

  vaccine.                                                                                        

- Social history:: Smoking status: Patient/guardian denies using tobacco, the patient             

  reports quitting approximately 10 years ago.                                                    

                                                                                                  

                                                                                                  

Screening:                                                                                        

15:30 Abuse screen: Denies threats or abuse. Nutritional screening: No deficits noted.        aa5 

      Tuberculosis screening: No symptoms or risk factors identified. Fall Risk IV access (20     

      points). Ambulatory Aid- Crutches/Cane/Walker (15 pts). Total Bejarano Fall Scale              

      indicates Low Risk Score (25-44 pts). Fall prevention measures have been instituted.        

      Side Rails Up X 2 Placed close to Nursing Station.                                          

                                                                                                  

Assessment:                                                                                       

15:30 General: Appears uncomfortable, Behavior is calm, cooperative. Pain: Complains of pain  aa5 

      in whole body Pain currently is 7 out of 10 on a pain scale. Quality of pain is             

      described as aching, Pain began 1-2 weeks ago Is continuous. Neuro: Level of                

      Consciousness is awake, alert, obeys commands, Oriented to person, place, time,             

      situation, Reports generalized weakness x 1-2 weeks ago. Cardiovascular: Heart tones S1     

      S2 present Rhythm is atrial fibrillation. Respiratory: Reports shortness of breath on       

      exertion cough that is productive, Airway is patent Respiratory effort is even,             

      unlabored, Respiratory pattern is tachypnea Breath sounds are diminished bilaterally.       

      GI: Abdomen is round non-distended, Bowel sounds present X 4 quads. Abd is soft and non     

      tender X 4 quads. Reports diarrhea. : No signs and/or symptoms were reported              

      regarding the genitourinary system. EENT: No signs and/or symptoms were reported            

      regarding the EENT system. Derm: Skin is pink, warm \T\ dry.                                

15:30 Reassessment: 20G to L FA noted .                                                       aa5 

16:15 Reassessment: Patient is alert, oriented x 3, equal unlabored respirations, skin        aa5 

      warm/dry/pink. Awaiting CT scan.                                                            

17:59 Reassessment: REGEN-COV consent signed by pt (see pt's chart).                          aa5 

17:59 Reassessment: Patient is alert, oriented x 3, equal unlabored respirations, skin        aa5 

      warm/dry/pink.                                                                              

19:25 Reassessment: Patient is alert, oriented x 3, equal unlabored respirations, skin        aa5 

      warm/dry/pink. No adverse reactions noted to REGEN-COV.                                     

19:50 Reassessment: Patient is alert, oriented x 3, equal unlabored respirations, skin        aa5 

      warm/dry/pink. Pt and pt's wife aware of 1 hr observation post infusion. .                  

21:37 Reassessment: Patient is alert, oriented x 3, equal unlabored respirations, skin        bb  

      warm/dry/pink. pt verbalized understanding of and agrees to plan of care discharge          

      instructions given pt assisted to exit by this RN via wheelchair.                           

                                                                                                  

Vital Signs:                                                                                      

13:30 BP 95 / 60; Pulse 90; Resp 20; Temp 100.1(TE); Pulse Ox 96% on R/A; Weight 76.66 kg;    iw  

      Height 6 ft. 3 in. (190.50 cm);                                                             

16:10  / 69; Pulse 97; Resp 28 S; Temp 99.2(TE); Pulse Ox 93% on R/A;                   aa5 

18:50 BP 99 / 76; Pulse 90; Resp 30 S; Pulse Ox 92% on R/A;                                   aa5 

19:25 BP 96 / 70; Pulse 84; Resp 26 S; Pulse Ox 91% on R/A;                                   aa5 

21:15  / 75; Pulse 95; Resp 21; Temp 97.7(TE); Pulse Ox 91% on R/A;                     oe  

13:30 Body Mass Index 21.12 (76.66 kg, 190.50 cm)                                             iw  

                                                                                                  

ED Course:                                                                                        

12:55 Patient arrived in ED.                                                                  ds1 

13:32 Triage completed.                                                                       iw  

13:35 Arm band placed on.                                                                     iw  

14:36 CXR XRAY In Process Unspecified.                                                        EDMS

15:30 Connie Long, SHARLENE is Primary Nurse.                                                   aa5 

15:30 Patient has correct armband on for positive identification. Bed in low position. Call   aa5 

      light in reach. Side rails up X2. Cardiac monitor on. Pulse ox on. NIBP on.                 

15:36 Greg Payne PA is PHCP.                                                                cp  

15:36 Avinash Araujo MD is Attending Physician.                                              cp  

17:04 CT Chest For PE Angio In Process Unspecified.                                           EDMS

17:04 CT Abd/Pelvis - IV Contrast Only In Process Unspecified.                                EDMS

17:55 PHCP role handed off by Greg Payne PA                                                 [facility] 

17:55 Flaquito Adler PA is PHCP.                                                              jmm 

18:35 IV discontinued, intact, bleeding controlled, Pressure dressing applied, Swelling noted aa5 

      to left FA after NS flush. 20G to L FA was dc'd.                                            

18:40 Inserted saline lock: 20 gauge in right wrist, using aseptic technique.                 aa5 

19:15 Report given to SHARLENE Cook.                                                             aa5 

21:38 No provider procedures requiring assistance completed. IV discontinued, intact,         bb  

      bleeding controlled, Pressure dressing applied.                                             

                                                                                                  

Administered Medications:                                                                         

14:00 Drug: NS 0.9% 500 ml Route: IV; Rate: bolus; Site: left forearm;                        iw  

15:30 Follow up: IV Status: Completed infusion; IV Intake: 500ml                              aa5 

16:10 Drug: SOLU-Medrol (methylPrednisoLONE) 125 mg Route: IVP; Site: left antecubital;       aa5 

16:15 Follow up: Response: No adverse reaction                                                aa5 

17:31 CANCELLED (Physician Discretion): NS 0.9% 500 ml IV at 250 ml/hr continuous             aa5 

18:45 Drug: NS 0.9% 500 ml Route: IV; Rate: 50 ml/hr; Site: right wrist;                      aa5 

18:45 Drug: REGEN-COV Dose Pack 120 mg/mL-120 mg/mL (EUA) 260 ml Route: IV; Rate: calculated  aa5 

      rate; Site: right wrist;                                                                    

19:50 Follow up: Response: No adverse reaction; IV Status: Completed infusion                 aa5 

                                                                                                  

                                                                                                  

Intake:                                                                                           

15:30 IV: 500ml; Total: 500ml.                                                                aa5 

                                                                                                  

Outcome:                                                                                          

20:58 Discharge ordered by MD.                                                                fan 

21:38 Discharged to home ambulatory, with family.                                             bb  

21:38 Condition: stable                                                                           

21:38 Discharge instructions given to patient, Instructed on discharge instructions, follow       

      up and referral plans. medication usage, Demonstrated understanding of instructions,        

      follow-up care, medications.                                                                

21:39 Patient left the ED.                                                                    bb  

                                                                                                  

Signatures:                                                                                       

Dispatcher [employer]                           EDMS                                                 

Flaquito Adler PA PA jmm Sanford, Demi                                ds1                                                  

Joyce Golden RN RN   bb                                                   

Lilibeth Jose RN RN iw Calderon, Audri, RN RN   aa5                                                  

Greg Payne PA PA cp Espinosa, Orlando oe                                                   

                                                                                                  

Corrections: (The following items were deleted from the chart)                                    

19:22 18:35 IV discontinued, intact, bleeding controlled, Pressure dressing applied, Swelling aa5 

      noted to left FA after NS flush. aa5                                                        

20:01 15:30 Respiratory: Reports shortness of breath on exertion cough that is productive,    aa5 

      Airway is patent Respiratory effort is even, unlabored, Respiratory pattern is              

      tachypnea Breath sounds are clear bilaterally. aa5                                          

                                                                                                  

**************************************************************************************************

## 2021-09-10 NOTE — XMS REPORT
Continuity of Care Document

                          Created on:September 10, 2021



Patient:LEISA SEPULVEDA

Sex:Male

:1945

External Reference #:[id number]





Demographics







                          Address                   [address] [place] [address]

 

                          Home Phone                [phone]

 

                          Work Phone                [phone]

 

                          Email Address             [email or web address]

 

                          Preferred Language        en-US

 

                          Marital Status            Unknown

 

                          Mandaeism Affiliation     Unknown

 

                          Race                      Unknown

 

                          Additional Race(s)        Unavailable

 

                          Ethnic Group              Unknown









Author







                          Organization              [facility]

t

 

                          Address                   [address]



                                                    [place] [address]

 

                          Phone                     [phone]









Care Team Providers







                    Name                Role                Phone

 

                    Lori               Attending Clinician [phone]

 

                    KOKO Spangler         Attending Clinician [phone]









Problems







       Condition Condition Condition Status Onset  Resolution Last   Treating Co

mments 

Source



       Name   Details Category        Date   Date   Treatment Clinician        



                                                 Date                 

 

       PREVENTIVE        Condition Active 2015              

 [facility]



       HEALTH                                10:30:00               l



       CARE                        00:00:                             [facility]



              PREVENTIVE                                                



              HEALTH                                                  



              CARE                                                    



                                                                      



                                                                      



              Active                                                  



                                                                      



                                                                      



              2013                                                  



                                                                      



                                                                      



               Condition                                                  



                                                                      



                                                                      



                                                                 



              5                                                       



                                                                      



                                                                      



                                                                      



                                                                     



              Medical                                                  



              Group                                                   



                                                                      



                                                                      

 

       Arthritis        Problem Resolve               2018               M

[facility]



       (disorder)               d                    13:12:48               l



                                                                      [facility]



              Arthritis                                                  



              (disorder)                                                  



                                                                      



                                                                      



               Resolved                                                  



                                                                      



                                                                      



                                                                      



                                                                      



                Problem                                                  



                                                                      



                                                                      



              2018                                                  



                                                                      



                                                                      



                                                                      



                                                                      



                 [facility]                                                  



              Group                                                   



                                                                      



                                                                      

 

       Strain of        Problem Resolve               2018               M

[facility]



       back                 d                    13:12:48               l



       muscle   Strain                                                  [facility]



       (disorder) of back                                                  



              muscle                                                  



              (disorder)                                                  



                                                                      



                                                                      



               Resolved                                                  



                                                                      



                                                                      



                                                                      



                                                                      



                Problem                                                  



                                                                      



                                                                      



              2018                                                  



                                                                      



                                                                      



                                                                      



                                                                      



                 [facility]                                                  



              Group                                                   



                                                                      



                                                                      

 

       Fracture        Problem Resolve               2018               Me

[facility]



       of forearm               d                    13:12:48               l



       (disorder)   Fracture                                                  He

rmann



              of forearm                                                  



              (disorder)                                                  



                                                                      



                                                                      



               Resolved                                                  



                                                                      



                                                                      



                                                                      



                                                                      



                Problem                                                  



                                                                      



                                                                      



              2018                                                  



                                                                      



                                                                      



               Left                                                   



                                                                      



                     [facility]                                                  



              Group                                                   



                                                                      



                                                                      

 

       Bronchitis        Problem Resolve               2018               

[facility]



       (disorder)               d                    13:12:48               l



                                                                      [facility]



              Bronchitis                                                  



              (disorder)                                                  



                                                                      



                                                                      



               Resolved                                                  



                                                                      



                                                                      



                                                                      



                                                                      



                Problem                                                  



                                                                      



                                                                      



              2018                                                  



                                                                      



                                                                      



                                                                      



                                                                      



                 [facility]                                                  



              Group                                                   



                                                                      



                                                                      

 

       External        Problem Resolve               2018               Me

[facility]



       hemorrhoid               d                    13:12:48               l



       s        External                                                  [place]

n



       (disorder) hemorrhoid                                                  



              s                                                       



              (disorder)                                                  



                                                                      



                                                                      



               Resolved                                                  



                                                                      



                                                                      



                                                                      



                                                                      



                Problem                                                  



                                                                      



                                                                      



              2018                                                  



                                                                      



                                                                      



                                                                      



                                                                      



                 [facility]                                                   



                                                                      



                                                                      

 

       Fatigue        Problem Resolve               2018               Mem

[facility]



       (finding)               d                    13:12:48               l



                Fatigue                                                  [facility]



              (finding)                                                  



                                                                      



                                                                      



              Resolved                                                  



                                                                      



                                                                      



                                                                      



                                                                      



               Problem                                                  



                                                                      



                                                                      



              2018                                                  



                                                                      



                                                                      



                                                                      



                                                                      



                 [facility]                                                  



              Group                                                   



                                                                      



                                                                      

 

       Idiopathic        Problem Resolve               2018               

[facility]



       peripheral               d                    13:12:48               l



       autonomic                                                         [facility]



       neuropathy Idiopathic                                                  



       (disorder) peripheral                                                  



              autonomic                                                  



              neuropathy                                                  



              (disorder)                                                  



                                                                      



                                                                      



               Resolved                                                  



                                                                      



                                                                      



                                                                      



                                                                      



                Problem                                                  



                                                                      



                                                                      



              2018                                                  



                                                                      



                                                                      



                                                                      



                                                                      



                                                                    



              Medical                                                  



              Group                                                   



                                                                      



                                                                      

 

       Viral         Problem Resolve               2018               [facility]

[facility]



       pneumonia               d                    13:12:48               l



       (disorder)   Viral                                                  [place]

nn



              pneumonia                                                  



              (disorder)                                                  



                                                                      



                                                                      



               Resolved                                                  



                                                                      



                                                                      



                                                                      



                                                                      



                Problem                                                  



                                                                      



                                                                      



              2018                                                  



                                                                      



                                                                      



                                                                      



                                                                      



                                                                    



              Medical                                                  



              Group                                                   



                                                                      



                                                                      

 

       Arthritis        Problem Active               2018               Me

[facility]



       of knee                                    13:12:48               l



       (disorder)                                                         [place]

n



              Arthritis                                                  



              of knee                                                  



              (disorder)                                                  



                                                                      



                                                                      



               Active                                                  



                                                                      



                                                                      



                                                                      



                                                                      



              Problem                                                  



                                                                      



                                                                      



              2018                                                  



                                                                      



                                                                      



               Data                                                   



              migrated                                                  



              from [employer]                                                  



              [employer]                                                  



              on                                                      



              5/30/15.                                                  



                                                                      



                                                                      



               Medical                                                  



              Group                                                   



                                                                      



                                                                      

 

       Asthma        Problem Active               2018               [facility]

ia



       (disorder)                                    13:12:48               l



                Asthma                                                  [facility]



              (disorder)                                                  



                                                                      



                                                                      



               Active                                                  



                                                                      



                                                                      



                                                                      



                                                                      



              Problem                                                  



                                                                      



                                                                      



              2018                                                  



                                                                      



                                                                      



               Data                                                   



              migrated                                                  



              from [employer]                                                  



              [employer]                                                  



              on                                                      



              5/30/15.                                                  



                                                                      



                                                                      



               Medical                                                  



              Group                                                   



                                                                      



                                                                      

 

       Atrial        Problem Active               2018               [facility]

ia



       fibrillati                                    13:12:48               l



       on       Atrial                                                  [place]



       (disorder) fibrillati                                                  



              on                                                      



              (disorder)                                                  



                                                                      



                                                                      



               Active                                                  



                                                                      



                                                                      



                                                                      



                                                                      



              Problem                                                  



                                                                      



                                                                      



              2018                                                  



                                                                      



                                                                      



               Data                                                   



              migrated                                                  



              from [facility]                                                  



              [facility]                                                  



              on                                                      



              5/30/15.                                                  



                                                                      



                                                                      



               Medical                                                  



              Group                                                   



                                                                      



                                                                      

 

       Benign        Problem Active               2018               [facility]

[facility]



       essential                                    13:12:48               l



       hypertensi   Benign                                                  [facility]

[facility]



       on     essential                                                  



       (disorder) hypertensi                                                  



              on                                                      



              (disorder)                                                  



                                                                      



                                                                      



               Active                                                  



                                                                      



                                                                      



                                                                      



                                                                      



              Problem                                                  



                                                                      



                                                                      



              2018                                                  



                                                                      



                                                                      



               Data                                                   



              migrated                                                  



              from [facility]                                                  



              [facility]                                                  



              on                                                      



              5/30/15.                                                  



                                                                      



                                                                      



               Medical                                                  



              Group                                                   



                                                                      



                                                                      

 

       Benign        Problem Active               2018               [facility]

[facility]



       prostatic                                    13:12:48               l



       hypertroph   Benign                                                  [facility]

[facility]



       with   prostatic                                                  



       outflow hypertroph                                                  



       obstructio with                                                    



       n      outflow                                                  



       (disorder) obstructio                                                  



              n                                                       



              (disorder)                                                  



                                                                      



                                                                      



               Active                                                  



                                                                      



                                                                      



                                                                      



                                                                      



              Problem                                                  



                                                                      



                                                                      



              2018                                                  



                                                                      



                                                                      



               Data                                                   



              migrated                                                  



              from [facility]                                                  



              [facility]                                                  



              on                                                      



              5/30/15.                                                  



                                                                      



                                                                      



               Medical                                                  



              Group                                                   



                                                                      



                                                                      

 

       Chronic        Problem Active               2018               [facility]

[facility]



       obstructiv                                    13:12:48               l



       e lung   Chronic                                                  [place]



       disease obstructiv                                                  



       (disorder) e lung                                                  



              disease                                                  



              (disorder)                                                  



                                                                      



                                                                      



               Active                                                  



                                                                      



                                                                      



                                                                      



                                                                      



              Problem                                                  



                                                                      



                                                                      



              2018                                                  



                                                                      



                                                                      



               Data                                                   



              migrated                                                  



              from [facility]                                                  



              [facility]                                                  



              on                                                      



              5/30/15.                                                  



                                                                      



                                                                      



               Medical                                                  



              Group                                                   



                                                                      



                                                                      

 

       Osteopenia        Problem Active               2018               [facility]

[facility]



       (disorder)                                    13:12:48               l



                                                                      [facility]



              Osteopenia                                                  



              (disorder)                                                  



                                                                      



                                                                      



               Active                                                  



                                                                      



                                                                      



                                                                      



                                                                      



              Problem                                                  



                                                                      



                                                                      



              2018                                                  



                                                                      



                                                                      



               Data                                                   



              migrated                                                  



              from [facility]                                                  



              [facility]                                                  



              on                                                      



              5/30/15.                                                  



                                                                      



                                                                      



               Medical                                                  



              Group                                                   



                                                                      



                                                                      

 

       Peripheral        Problem Active               2018               [facility]

[facility]



       nerve                                     13:12:48               l



       disease                                                         [facility]



       (disorder) Peripheral                                                  



              nerve                                                   



              disease                                                  



              (disorder)                                                  



                                                                      



                                                                      



               Active                                                  



                                                                      



                                                                      



                                                                      



                                                                      



              Problem                                                  



                                                                      



                                                                      



              2018                                                  



                                                                      



                                                                      



               Data                                                   



              migrated                                                  



              from [facility]                                                  



              on                                                      



              5/30/15.                                                  



                                                                      



                                                                      



               Medical                                                  



              Group                                                   



                                                                      



                                                                      

 

       ATRIAL        Condition Active               2015               [facility]

[facility]



       FIBRILLATI                                    10:30:00               l



       ON       ATRIAL                                                  [facility]



              FIBRILLATI                                                  



              ON                                                      



                                                                      



                  Active                                                  



                                                                      



                                                                      



                                                                      



                                                                      



                                                                      



              Condition                                                  



                                                                      



                                                                      



              2015                                                  



                                                                      



                                                                      



                                                                      



                                                                      



                                                                    



              Medical                                                  



              Group                                                   



                                                                      



                                                                      

 

       ESSENTIAL        Condition Active               2015               

[facility]



       HYPERTENSI                                    10:30:00               l



       ON, BENIGN                                                         [place]

n



              ESSENTIAL                                                  



              HYPERTENSI                                                  



              ON, BENIGN                                                  



                                                                      



                                                                      



               Active                                                  



                                                                      



                                                                      



                                                                      



                                                                      



              Condition                                                  



                                                                      



                                                                      



              2015                                                  



                                                                      



                                                                      



                                                                      



                                                                      



                                                                    



              Medical                                                  



              Group                                                   



                                                                      



                                                                      

 

       OSTEOPENIA        Condition Active               2015              

 [facility]



                                                 10:30:00               l



                                                                      [facility]



              OSTEOPENIA                                                  



                                                                      



                                                                      



               Active                                                  



                                                                      



                                                                      



                                                                      



                                                                      



              Condition                                                  



                                                                      



                                                                      



              2015                                                  



                                                                      



                                                                      



                                                                      



                                                                      



                                                                    



              Medical                                                  



              Group                                                   



                                                                      



                                                                      

 

       COPD          Condition Active               2015               [facility]

[facility]



                                                 10:30:00               l



                COPD                                                  [facility]



                                                                      



                                                                      



              Active                                                  



                                                                      



                                                                      



                                                                      



                                                                      



              Condition                                                  



                                                                      



                                                                      



              2015                                                  



                                                                      



                                                                      



                                                                      



                                                                      



                                                                    



              Medical                                                  



              Group                                                   



                                                                      



                                                                      

 

       ASTHMA,        Condition Active               2015               [facility]

[facility]



       UNSPECIFIE                                    10:30:00               l



       D,       ASTHMA,                                                  [place]



       UNSPECIFIE UNSPECIFIE                                                  



       D STATUS D,                                                      



              UNSPECIFIE                                                  



              D STATUS                                                  



                                                                      



                                                                      



              Active                                                  



                                                                      



                                                                      



                                                                      



                                                                      



              Condition                                                  



                                                                      



                                                                      



              2015                                                  



                                                                      



                                                                      



                                                                      



                                                                      



                                                                    



              Medical                                                  



              Group                                                   



                                                                      



                                                                      

 

       BENIGN        Condition Active               2015               [facility]

[facility]



       PROSTATIC                                    10:30:00               l



       HYPERTROPH   BENIGN                                                  [facility]

[facility]



       Y, WITH PROSTATIC                                                  



       URINARY HYPERTROPH                                                  



       OBSTRUCTIO Y, WITH                                                  



       N      URINARY                                                  



              OBSTRUCTIO                                                  



              N                                                       



                                                                      



                 Active                                                  



                                                                      



                                                                      



                                                                      



                                                                      



                                                                      



              Condition                                                  



                                                                      



                                                                      



              2015                                                  



                                                                      



                                                                      



                                                                      



                                                                      



                                                                    



              Medical                                                  



              Group                                                   



                                                                      



                                                                      

 

       ARTHRITIS,        Condition Active               2015              

 [facility]



       KNEES,                                    10:30:00               l



       BILATERAL                                                         [place]



              ARTHRITIS,                                                  



              KNEES,                                                  



              BILATERAL                                                  



                                                                      



                                                                      



              Active                                                  



                                                                      



                                                                      



                                                                      



                                                                      



              Condition                                                  



                                                                      



                                                                      



              2015                                                  



                                                                      



                                                                      



                                                                      



                                                                      



                 [facility]                                                  



              Group                                                   



                                                                      



                                                                      

 

       PERIPHERAL        Condition Active               2015              

 [facility]



       NEUROPATHY                                    10:30:00               l



                                                                      [facility]vin



              PERIPHERAL                                                  



              NEUROPATHY                                                  



                                                                      



                                                                      



               Active                                                  



                                                                      



                                                                      



                                                                      



                                                                      



              Condition                                                  



                                                                      



                                                                      



              2015                                                  



                                                                      



                                                                      



                                                                      



                                                                      



                                                                    



              Medical                                                  



              Group                                                   



                                                                      



                                                                      







Allergies, Adverse Reactions, Alerts

This patient has no known allergies or adverse reactions.



Social History







           Social Habit Start Date Stop Date  Quantity   Comments   Source

 

           Social History 2016                       [facility]



                      17:00:20   17:00:20                         







Medications







       Ordered Filled Start  Stop   Current Ordering Indication Dosage Frequency

 Signature

                    Comments            Components          Source



     Medication Medication Date Date Medication? Clinician                (SIG) 

          



     Name Name                                                   

 

     naproxen            Yes                      500 mg = 1           Mem

oria



     500 mg oral      3-15                               tab, PO,           l



     tablet      19:15:                               PRN, PRN           Irvin



               31                                 Pain Score           



                                                  6-10, # 30           



                                                  caplet, 6           



                                                  Refill(s),           



                                                  Pharmacy:           



                                                  [facility]           



                                                  [facility]           

 

     Amlodipine            Yes                      See            [facility]



     10 MG /      3-15                               Instructio           l



     Benazepril      19:13:                               ns, TAKE 1           H

ermann



     hydrochlori      50                                 CAPSULE           



     de 40 MG                                         DAILY, #           



     Oral                                         90 tab, 3           



     Capsule                                         Refill(s),           



                                                  Pharmacy:           



                                                  [facility]           



                                                  [facility]           

 

     naproxen            No                       500 mg = 1           Mem

oria



     500 mg oral      1-15                               tab, PO,           l



     tablet      15:43:                               PRN, PRN                                            Pain Score           



                                                  6-10, # 30           



                                                  caplet, 6           



                                                  Refill(s),           



                                                  Pharmacy:           



                                                  [facility]           



                                                  Home           



                                                  Delivery           

 

     NAPROXEN      2013      No                       PRN            [facility]



     SODIUM 550      1-05                                              l



     MG TABS      00:00:                                                                                              

 

     LOTREL 5-20      2013      Yes                      1 tablet           Me

moria



     MG CAPS      1-05                               twice day           l



               00:00:                                                                                              

 

     NAPROXEN      2013      Yes                      1 tablet           Memor

ia



     500 MG TABS      1-05                               daily           l



               00:00:                                                                                              

 

     MULTAQ 400      2013      Yes                      1 tablet           Mem

oria



     MG TABS      1-05                               twice day           l



               00:00:                                                                                              

 

     PRADAXA 150      2013      Yes                      1 tablet           Me

moria



     MG CAPS      1-05                               daily           l



               00:00:                                                                                              

 

     CENTRUM      2013      Yes                                     [facility]



     SILVER      1-05                                              l



     ULTRA MENS      00:00:                                              



     TABS                                                      

 

     SUPER      2013      Yes                                     [facility]



     B-COMPLEX      1-05                                              l



     CAPS      00:00:                                                                                              

 

     DEXILANT 30      2013      Yes                                     Mem[facility]

a



     MG CPDR      1-05                                              l



               00:00:                                                                                              

 

     NAPROXEN      2013      No                       PRN            [facility]



     SODIUM 550      1-05                                              l



     MG TABS      00:00:                                                                                              

 

     NAPROXEN      2013      Yes                      1 tablet           Mem[facility]

ia



     500 MG TABS      1-05                               daily           l



               00:00:                                                                                              

 

     MULTAQ 400      2013      Yes                      1 tablet           Mem

oria



     MG TABS      1-05                               twice day           l



               00:00:                                                                                              

 

     PRADAXA 150      2013      Yes                      1 tablet           Me

moria



     MG CAPS      1-05                               daily           l



               00:00:                                                                                              







Vital Signs







             Vital Name   Observation Time Observation Value Comments     Source

 

             Temperature Oral (F) 2018-03-15 18:48:00 98.5 F                    

[facility]

 

             Heart Rate   2018-03-15 18:48:00                           [facility]

 Ir[facility]

 

             BMI Calculated 2018-03-15 18:48:00                           [facility]

al [place]

 

             Height       2018-03-15 18:48:00 190.5 cm                  [facility]

 [facility]

 

             Weight       2018-03-15 18:48:00                           [facility]

 [facility]

 

             Systolic (mm Hg) 2018-03-15 18:48:00                           [facility]

rial [facility]

 

             Diastolic (mm Hg) 2018-03-15 18:48:00                           [facility]

orial [place]

 

             Weight       2015 15:46:50                           [facility]

 [facility]

 

             Temperature Oral (F) 2015 15:46:50 98.1 F                    

[facility]

 

             Heart Rate   2015 15:46:50                           [facility]

 [facility]

 

             Systolic (mm Hg) 2015 15:46:50                           [facility]

rial [facility]

 

             Diastolic (mm Hg) 2015 15:46:50                           [facility]

orial [place]

 

             Height       2015 15:46:50                           [facility]

 [facility]

 

             Weight       2013 20:34:21                           [facility]

 [place]

 

             Temperature Oral (F) 2013 20:34:21 97.0 F                    

[facility]

 

             Heart Rate   2013 20:34:21                           [facility]

 [facility]

 

             Systolic (mm Hg) 2013 20:34:21                           [facility]

rial [facility]

 

             Diastolic (mm Hg) 2013 20:34:21                           Mem

orial [place]

 

             Height       2013 20:34:21                           [facility]

 [facility]







Procedures







                Procedure       Date / Time Performed Performing Clinician [facility]

bhavya

 

                Collection of venous 2018-03-15 18:56:00                 [facility]

l [facility]



                blood by venipuncture                                 

 

                bone density    2010-09-10 14:20:47                 [facility]

aaron

 

                Procedure on eye                                 [facility]

n

 

                Procedure on wrist                                 [facility]

nellie

 

                Sebaceous cyst                                  [facility]



                removal<sup>1</sup>                                 







Encounters







        Start   End     Encounter Admission Attending Care    Care    Encounter 

Source



        Date/Time Date/Time Type    Type    Clinicians Facility Department ID   

   

 

        2018 Ambulatory                 nullFlavo [facility]    [id number]

[id number] [facility]



        15:30:00 15:30:00 Pre-Reg                 r       Primary 02      l



                                                        Care Upper         [place]

nn



                                                        Ray           

 

        2018 Outpatient                 BETH CAMPOS    [id number]

465 [facility]



        10:30:00 10:30:00                                         02      l



                                                                        [place]

 

        2018 Outpatient         Lori,  SUZANNE    [facility]    [id number]

465 



        10:30:00 10:30:00                 [place]                  02      

 

        2018 Outpatient         Lori,  MG    [facility]    [id number]

465 



        10:30:00 10:30:00                 [place]                  02      

 

        2018-03-15 2018 Outpatient                 nullFlavo [facility]    [id number]

[id number] [facility]



        19:00:00 04:59:59                         r       Primary 01      [facility]           

 

        2018-03-15 2018-03-15 Outpatient         Granier, MHMG    [facility]    [id number]

[id number] 



        14:00:00 23:59:59                 Lasha                  01      

 

        2018-03-15 2018-03-15 Outpatient                 MHIE    [facility]    [id number]

465 [facility]



        14:00:00 14:00:00                                         01      therese Bryan

 

        2018-01-15 2018 Phone                   nullFlavo [facility]    [id number]

55 [facility]



        15:18:00 05:59:59 Message                 r       Primary 01      [facility]           

 

        2018-01-15 2018 Outpatient                 [facility]    [id number]

[id number] 



        09:18:00 23:59:59                                         01      

 

        2015 Office                  [facility]Flavo [facility] [id number]

210 [facility]



        00:00:00 00:00:00 Visit                   desiree Bryan [id number]  therese



                                                        [facility]           

 

        2015 Lab Report                 [facility]Flavo [facility] 1738

[id number] [facility]



        00:00:00 00:00:00                         desiree Bryan [id number]  l



                                                        [facility]           

 

        2013 Lab Report                 [facility]Flavo [facility] 1699

[id number] [facility]



        00:00:00 00:00:00                         desiree Bryan [id number]  l



                                                        [facility]           







Results







           Test Description Test Time  Test Comments Results    Result Comments 

Source

 

           Chemistry  2015            159                   [facility]

nn



                      16:30:00                                    

 

           Chemistry  2015            48                    [facility]

nn



                      16:30:00                                    

 

           Chemistry  2015            63                    [facility]

nn



                      16:30:00                                    

 

           Chemistry  2015            86                    [facility]

nn



                      16:30:00                                    

 

           Chemistry  2015            140 MEQ/L             [facility]

nn



                      16:30:00                                    

 

           Chemistry  2015            4.6 MEQ/L             [facility]

nn



                      16:30:00                                    

 

           Chemistry  2015            1.2                   [facility]

nn



                      16:30:00                                    

 

           Chemistry  2015            22                    [facility]

nn



                      16:30:00                                    









                    Chemistry           2015 16:30:00 









                      Test Item  Value      Reference Range Interpretation Comme

nts









             BUN/CREAT (test code = BUN/CREAT) 18 1                         

      



[facility] DklkzugPwjryysix6774-63-66 16:30:004.2[facility]ann[facility]
2015 16:30:009.0[facility] ZljiqocIgzxiaitq0834-74-00 16:30:0025[facility] 
AokldbnYvsbgagsc1782-07-42 16:30:0023[facility] RjdoqgdElyivasta9101-88-17 
16:30:0078[facility] KdfufkkJggqzejpq7362-27-88 16:30:001.960[facility]
Vbvwyrzxw5953-31-43 16:30:002.46[facility] IoglonoFhdaknuelg8257-07-93 16:30:00
16.0[facility] FedugnrQikryptqpx6293-92-58 16:30:0046.0[facility]ann[facility]
2015 16:30:18681 K/CMMMemorial DfphmvxVmusjxdws1724-31-07 21:30:0019
[facility] EmhschpBmzatmzhj5492-28-23 21:30:0080[facility]
2013 21:30:001.570[facility] FmoajbpSwbrhruli3791-27-17 21:30:0080[facility] 
NztmcaoLmdovqdca0663-38-88 21:30:001.570[facility] ZbgzsdcDwadfpvuu7192-93-24 
21:30:002.54[facility] QhqhxfoUfiplqbhwe0563-27-71 21:30:0015.4[facility]
Nsfuduofun1714-67-86 21:30:0045.7[facility] MmclgnxFnjqqcryhi1345-50-57 21:30:00
177 K/CMMMemorial QywhgwkMumoshbgyo4114-93-28 21:30:0015.4[facility]
Gdvyxhyhpk8953-37-83 21:30:0045.7[facility] FupehycUtobixlhoy7579-42-87 21:30:00
177 K/CMMMemorial KkzuudeBadrsnsgi2159-66-34 21:30:81788Aguzubob [place]
Bmpgstmoc8545-70-87 21:30:0045[facility] UycqnugPuswybrgx7137-90-71 21:30:0069
[facility] MwvuamaVmdhsgqre2198-12-90 21:30:22712Infehgiq HermannChemistry
2013 21:30:0045[facility] ZmfsxybQalsulafl9188-23-76 21:30:0069[facility] 
LsekaqlSszwqlqdi3245-49-31 21:30:43389 MEQ/LMemorial FuydkqpNjcmwivng7901-06-29 
21:30:004.2 MEQ/LMemorial XxplchfUmiqdkqmx7991-41-92 21:30:001.0[facility]
Gcexdazup3036-88-11 21:30:0018[facility] BeavlzrWbsxyhcfe2536-28-52 21:30:00





             Test Item    Value        Reference Range Interpretation Comments

 

             BUN/CREAT (test code = BUN/CREAT) 18 1         6-25                

      



[facility] AsvcdufAvrawthya5420-66-54 21:30:004.3[facility]ann[facility]
2013 21:30:009.3[facility] BewtfryTyvhasaiv1002-78-03 21:30:0027[facility] 
HviwagtQudqazzkt6900-44-28 21:30:0019[facility] SamqdgtOwjevhokt7224-92-90 
21:30:002.54[facility] ZmfecneRzngryxwy8113-22-58 21:30:0045[facility]
Xihvebjva8274-58-66 21:30:0069[facility] YomkemsJaodblcfh8917-62-44 21:30:70050
[facility] SoffvfgJvyitfyzf2649-84-95 21:30:0045[facility]ann[facility]
2013 21:30:0069[facility] BmvqsliFknyuteon4451-66-48 21:30:09049Scxebhic 
WeloffzFskqkcghi1472-99-41 21:30:89562 MEQ/LMemorial BogeupqKotuvgqyj3731-62-74 
21:30:004.2 MEQ/LMemorial GlmslydWiplgukta0886-42-48 21:30:001.0[facility]
Cxlnanfdu7550-29-15 21:30:0018[facility] LpwnfkqZebyekwbo8069-12-85 21:30:00





             Test Item    Value        Reference Range Interpretation Comments

 

             BUN/CREAT (test code = BUN/CREAT) 18 1                         

      



[facility] YukxrhvZueazrtbf4075-15-30 21:30:004.3[facility]ann[facility]
2013 21:30:009.3[facility] PfothnfEcluvppnd1193-66-75 21:30:0027[facility] 
KgmwtxtVzzryiyad8938-62-64 14:22:173.3[facility] KtwhrgqPofkvnqmv0758-43-76 
14:22:173.3[facility] FjvqhfvRcmzfzvqk0605-58-62 14:20:473.3[facility]
Ocxpjshfi3570-57-71 14:20:473.3[facility]

## 2025-04-22 ENCOUNTER — HOSPITAL ENCOUNTER (EMERGENCY)
Dept: HOSPITAL 97 - ER | Age: 80
Discharge: HOME | End: 2025-04-22
Payer: COMMERCIAL

## 2025-04-22 VITALS — OXYGEN SATURATION: 97 % | TEMPERATURE: 98.7 F

## 2025-04-22 VITALS — DIASTOLIC BLOOD PRESSURE: 69 MMHG | SYSTOLIC BLOOD PRESSURE: 131 MMHG

## 2025-04-22 DIAGNOSIS — K59.00: ICD-10-CM

## 2025-04-22 DIAGNOSIS — K80.80: Primary | ICD-10-CM

## 2025-04-22 LAB
ALBUMIN SERPL BCP-MCNC: 3.9 G/DL (ref 3.4–5)
ANION GAP SERPL CALC-SCNC: 7.2 MEQ/L (ref 5–15)
GLOBULIN SER CALC-MCNC: 3.8 G/DL (ref 2.3–3.5)
HCT VFR BLD CALC: 46.5 % (ref 39.6–49)
HGB BLD-MCNC: 16.3 G/DL (ref 13.6–17.9)
LYMPHOCYTES # SPEC AUTO: 1.2 K/UL (ref 0.7–4.9)
MCH RBC QN AUTO: 34.5 PG (ref 27–35)
MCHC RBC AUTO-ENTMCNC: 35.1 G/DL (ref 32–36)
MCV RBC: 98.4 FL (ref 80–100)
NRBC BLD AUTO-RTO: 0.1 % (ref 0–0)
PMV BLD: 8.9 FL (ref 7.6–11.3)
POTASSIUM SERPL-SCNC: 4.2 MEQ/L (ref 3.5–5.1)
RBC # BLD: 4.73 M/UL (ref 4.33–5.43)
SQUAMOUS URNS QL MICRO: <5 /HPF
UA COMPLETE W REFLEX CULTURE PNL UR: (no result)
UA DIPSTICK W REFLEX MICRO PNL UR: (no result)

## 2025-04-22 PROCEDURE — 83690 ASSAY OF LIPASE: CPT

## 2025-04-22 PROCEDURE — 76705 ECHO EXAM OF ABDOMEN: CPT

## 2025-04-22 PROCEDURE — 74176 CT ABD & PELVIS W/O CONTRAST: CPT

## 2025-04-22 PROCEDURE — 36415 COLL VENOUS BLD VENIPUNCTURE: CPT

## 2025-04-22 PROCEDURE — 80053 COMPREHEN METABOLIC PANEL: CPT

## 2025-04-22 PROCEDURE — 85025 COMPLETE CBC W/AUTO DIFF WBC: CPT

## 2025-04-22 PROCEDURE — 81001 URINALYSIS AUTO W/SCOPE: CPT

## 2025-04-22 PROCEDURE — 99284 EMERGENCY DEPT VISIT MOD MDM: CPT
